# Patient Record
Sex: FEMALE | Race: WHITE | Employment: UNEMPLOYED | URBAN - METROPOLITAN AREA
[De-identification: names, ages, dates, MRNs, and addresses within clinical notes are randomized per-mention and may not be internally consistent; named-entity substitution may affect disease eponyms.]

---

## 2017-01-19 ENCOUNTER — APPOINTMENT (OUTPATIENT)
Dept: CT IMAGING | Age: 67
End: 2017-01-19
Payer: COMMERCIAL

## 2017-01-19 ENCOUNTER — APPOINTMENT (OUTPATIENT)
Dept: GENERAL RADIOLOGY | Age: 67
End: 2017-01-19
Payer: COMMERCIAL

## 2017-01-19 ENCOUNTER — HOSPITAL ENCOUNTER (EMERGENCY)
Age: 67
Discharge: HOME | End: 2017-01-19
Payer: COMMERCIAL

## 2017-01-19 VITALS
BODY MASS INDEX: 28.25 KG/M2 | WEIGHT: 180 LBS | DIASTOLIC BLOOD PRESSURE: 42 MMHG | RESPIRATION RATE: 20 BRPM | HEART RATE: 58 BPM | TEMPERATURE: 98.1 F | SYSTOLIC BLOOD PRESSURE: 163 MMHG | OXYGEN SATURATION: 97 % | HEIGHT: 67 IN

## 2017-01-19 DIAGNOSIS — S01.512A INTRAORAL LACERATION, INITIAL ENCOUNTER: ICD-10-CM

## 2017-01-19 DIAGNOSIS — S00.83XA FACIAL CONTUSION, INITIAL ENCOUNTER: Primary | ICD-10-CM

## 2017-01-19 DIAGNOSIS — S00.511A LIP ABRASION, INITIAL ENCOUNTER: ICD-10-CM

## 2017-01-19 DIAGNOSIS — S82.021A CLOSED DISPLACED LONGITUDINAL FRACTURE OF RIGHT PATELLA, INITIAL ENCOUNTER: ICD-10-CM

## 2017-01-19 PROCEDURE — 72125 CT NECK SPINE W/O DYE: CPT

## 2017-01-19 PROCEDURE — 73562 X-RAY EXAM OF KNEE 3: CPT

## 2017-01-19 PROCEDURE — 99284 EMERGENCY DEPT VISIT MOD MDM: CPT

## 2017-01-19 PROCEDURE — 70450 CT HEAD/BRAIN W/O DYE: CPT

## 2017-01-19 PROCEDURE — 70486 CT MAXILLOFACIAL W/O DYE: CPT

## 2017-01-19 PROCEDURE — 29505 APPLICATION LONG LEG SPLINT: CPT

## 2017-01-19 RX ORDER — M-VIT,TX,IRON,MINS/CALC/FOLIC 27MG-0.4MG
1 TABLET ORAL
COMMUNITY

## 2017-01-19 RX ORDER — LORAZEPAM 0.5 MG/1
0.5 TABLET ORAL DAILY
Status: ON HOLD | COMMUNITY
End: 2018-03-17 | Stop reason: HOSPADM

## 2017-01-19 RX ORDER — LEVOTHYROXINE SODIUM 0.03 MG/1
25 TABLET ORAL DAILY
COMMUNITY

## 2017-01-19 RX ORDER — CELECOXIB 200 MG/1
200 CAPSULE ORAL 2 TIMES DAILY
Status: ON HOLD | COMMUNITY
End: 2018-03-17 | Stop reason: HOSPADM

## 2017-01-19 RX ORDER — IBUPROFEN 800 MG/1
800 TABLET ORAL EVERY 6 HOURS PRN
Qty: 20 TABLET | Refills: 0 | Status: ON HOLD | OUTPATIENT
Start: 2017-01-19 | End: 2018-03-17 | Stop reason: HOSPADM

## 2017-01-19 RX ORDER — BUPROPION HYDROCHLORIDE 100 MG/1
100 TABLET ORAL DAILY
COMMUNITY

## 2017-01-19 RX ORDER — LISINOPRIL 20 MG/1
20 TABLET ORAL DAILY
COMMUNITY

## 2017-01-19 RX ORDER — CEPHALEXIN 500 MG/1
500 CAPSULE ORAL 4 TIMES DAILY
Qty: 40 CAPSULE | Refills: 0 | Status: SHIPPED | OUTPATIENT
Start: 2017-01-19

## 2017-01-19 RX ORDER — PRAVASTATIN SODIUM 10 MG
10 TABLET ORAL
Status: ON HOLD | COMMUNITY
End: 2018-03-17 | Stop reason: HOSPADM

## 2017-01-19 RX ORDER — HYDROCODONE BITARTRATE AND ACETAMINOPHEN 5; 325 MG/1; MG/1
1-2 TABLET ORAL EVERY 6 HOURS PRN
Qty: 14 TABLET | Refills: 0 | Status: SHIPPED | OUTPATIENT
Start: 2017-01-19 | End: 2017-01-26

## 2017-01-19 ASSESSMENT — PAIN DESCRIPTION - DESCRIPTORS: DESCRIPTORS: ACHING

## 2017-01-19 ASSESSMENT — PAIN DESCRIPTION - LOCATION: LOCATION: MOUTH

## 2017-01-19 ASSESSMENT — PAIN DESCRIPTION - PAIN TYPE: TYPE: ACUTE PAIN

## 2017-01-19 ASSESSMENT — PAIN SCALES - GENERAL: PAINLEVEL_OUTOF10: 3

## 2017-01-19 ASSESSMENT — ENCOUNTER SYMPTOMS
VOMITING: 0
COLOR CHANGE: 0
ABDOMINAL PAIN: 0
SHORTNESS OF BREATH: 0
BACK PAIN: 0
NAUSEA: 0
CHEST TIGHTNESS: 0

## 2017-01-19 ASSESSMENT — PAIN DESCRIPTION - FREQUENCY: FREQUENCY: CONTINUOUS

## 2017-01-19 ASSESSMENT — PAIN SCALES - WONG BAKER: WONGBAKER_NUMERICALRESPONSE: 2

## 2017-01-19 ASSESSMENT — PAIN DESCRIPTION - ORIENTATION: ORIENTATION: MID

## 2017-03-08 ENCOUNTER — HOSPITAL ENCOUNTER (OUTPATIENT)
Dept: PHYSICAL THERAPY | Age: 67
Setting detail: THERAPIES SERIES
Discharge: HOME | End: 2017-03-08
Payer: COMMERCIAL

## 2017-03-08 PROCEDURE — G8979 MOBILITY GOAL STATUS: HCPCS

## 2017-03-08 PROCEDURE — G8978 MOBILITY CURRENT STATUS: HCPCS

## 2017-03-08 PROCEDURE — 97162 PT EVAL MOD COMPLEX 30 MIN: CPT

## 2017-03-15 ENCOUNTER — HOSPITAL ENCOUNTER (OUTPATIENT)
Dept: PHYSICAL THERAPY | Age: 67
Setting detail: THERAPIES SERIES
Discharge: HOME | End: 2017-03-15
Payer: COMMERCIAL

## 2017-03-15 PROCEDURE — 97535 SELF CARE MNGMENT TRAINING: CPT

## 2017-03-15 PROCEDURE — 97110 THERAPEUTIC EXERCISES: CPT

## 2017-03-15 PROCEDURE — 97112 NEUROMUSCULAR REEDUCATION: CPT

## 2017-03-22 ENCOUNTER — HOSPITAL ENCOUNTER (OUTPATIENT)
Dept: PHYSICAL THERAPY | Age: 67
Setting detail: THERAPIES SERIES
Discharge: HOME | End: 2017-03-22
Payer: COMMERCIAL

## 2017-03-22 PROCEDURE — 97112 NEUROMUSCULAR REEDUCATION: CPT

## 2017-03-22 PROCEDURE — 97110 THERAPEUTIC EXERCISES: CPT

## 2017-03-22 PROCEDURE — 97116 GAIT TRAINING THERAPY: CPT

## 2017-03-22 ASSESSMENT — PAIN DESCRIPTION - LOCATION: LOCATION: KNEE

## 2017-03-22 ASSESSMENT — PAIN DESCRIPTION - DESCRIPTORS: DESCRIPTORS: DISCOMFORT

## 2017-03-22 ASSESSMENT — PAIN DESCRIPTION - ORIENTATION: ORIENTATION: RIGHT;LEFT

## 2017-03-22 ASSESSMENT — PAIN SCALES - GENERAL: PAINLEVEL_OUTOF10: 3

## 2017-03-29 ENCOUNTER — HOSPITAL ENCOUNTER (OUTPATIENT)
Dept: PHYSICAL THERAPY | Age: 67
Discharge: HOME | End: 2017-03-29

## 2017-04-05 ENCOUNTER — HOSPITAL ENCOUNTER (OUTPATIENT)
Dept: PHYSICAL THERAPY | Age: 67
Setting detail: THERAPIES SERIES
Discharge: HOME | End: 2017-04-05
Payer: COMMERCIAL

## 2017-04-05 PROCEDURE — 97112 NEUROMUSCULAR REEDUCATION: CPT

## 2017-04-05 PROCEDURE — 97116 GAIT TRAINING THERAPY: CPT

## 2017-04-05 PROCEDURE — 97110 THERAPEUTIC EXERCISES: CPT

## 2017-04-12 ENCOUNTER — HOSPITAL ENCOUNTER (OUTPATIENT)
Dept: PHYSICAL THERAPY | Age: 67
Setting detail: THERAPIES SERIES
Discharge: HOME | End: 2017-04-12
Payer: COMMERCIAL

## 2017-04-12 PROCEDURE — 97110 THERAPEUTIC EXERCISES: CPT

## 2017-04-12 PROCEDURE — 97116 GAIT TRAINING THERAPY: CPT

## 2017-04-12 PROCEDURE — 97112 NEUROMUSCULAR REEDUCATION: CPT

## 2017-04-26 ENCOUNTER — CLINICAL DOCUMENTATION (OUTPATIENT)
Dept: PHYSICAL THERAPY | Age: 67
End: 2017-04-26

## 2017-06-16 ENCOUNTER — CLINICAL DOCUMENTATION (OUTPATIENT)
Dept: PHYSICAL THERAPY | Age: 67
End: 2017-06-16

## 2018-03-16 ENCOUNTER — HOSPITAL ENCOUNTER (OUTPATIENT)
Age: 68
Setting detail: OBSERVATION
Discharge: HOME | End: 2018-03-17
Attending: STUDENT IN AN ORGANIZED HEALTH CARE EDUCATION/TRAINING PROGRAM | Admitting: INTERNAL MEDICINE
Payer: MEDICARE

## 2018-03-16 ENCOUNTER — APPOINTMENT (OUTPATIENT)
Dept: GENERAL RADIOLOGY | Age: 68
End: 2018-03-16
Payer: MEDICARE

## 2018-03-16 DIAGNOSIS — R52 INTRACTABLE PAIN: ICD-10-CM

## 2018-03-16 DIAGNOSIS — R53.1 GENERAL WEAKNESS: ICD-10-CM

## 2018-03-16 DIAGNOSIS — M54.32 SCIATICA OF LEFT SIDE: ICD-10-CM

## 2018-03-16 DIAGNOSIS — M25.552 LEFT HIP PAIN: Primary | ICD-10-CM

## 2018-03-16 LAB
ALBUMIN SERPL-MCNC: 4.2 G/DL (ref 3.9–4.9)
ALP BLD-CCNC: 144 U/L (ref 40–130)
ALT SERPL-CCNC: 33 U/L (ref 0–33)
AMPHETAMINE SCREEN, URINE: NORMAL
ANION GAP SERPL CALCULATED.3IONS-SCNC: 11 MEQ/L (ref 7–13)
APTT: 25 SEC (ref 21.6–35.4)
AST SERPL-CCNC: 46 U/L (ref 0–35)
BACTERIA: ABNORMAL /HPF
BARBITURATE SCREEN URINE: NORMAL
BASOPHILS ABSOLUTE: 0 K/UL (ref 0–0.2)
BASOPHILS RELATIVE PERCENT: 0.6 %
BENZODIAZEPINE SCREEN, URINE: NORMAL
BILIRUB SERPL-MCNC: 0.7 MG/DL (ref 0–1.2)
BILIRUBIN URINE: NEGATIVE
BLOOD, URINE: ABNORMAL
BUN BLDV-MCNC: 15 MG/DL (ref 8–23)
C-REACTIVE PROTEIN, HIGH SENSITIVITY: 1.2 MG/L (ref 0–5)
CALCIUM SERPL-MCNC: 9.7 MG/DL (ref 8.6–10.2)
CANNABINOID SCREEN URINE: NORMAL
CARBOXYHEMOGLOBIN: 5.6 % (ref 0–4)
CHLORIDE BLD-SCNC: 100 MEQ/L (ref 98–107)
CHOLESTEROL, TOTAL: 236 MG/DL (ref 0–199)
CK MB: 12.7 NG/ML (ref 0–3.8)
CLARITY: ABNORMAL
CO2: 26 MEQ/L (ref 22–29)
COCAINE METABOLITE SCREEN URINE: NORMAL
COLOR: YELLOW
CREAT SERPL-MCNC: 0.37 MG/DL (ref 0.5–0.9)
CREATINE KINASE-MB INDEX: 3.2 % (ref 0–3.5)
EKG ATRIAL RATE: 60 BPM
EKG P AXIS: 51 DEGREES
EKG P-R INTERVAL: 142 MS
EKG Q-T INTERVAL: 444 MS
EKG QRS DURATION: 80 MS
EKG QTC CALCULATION (BAZETT): 444 MS
EKG R AXIS: 21 DEGREES
EKG T AXIS: 57 DEGREES
EKG VENTRICULAR RATE: 60 BPM
EOSINOPHILS ABSOLUTE: 0 K/UL (ref 0–0.7)
EOSINOPHILS RELATIVE PERCENT: 0.1 %
EPITHELIAL CELLS, UA: ABNORMAL /HPF
GFR AFRICAN AMERICAN: >60
GFR NON-AFRICAN AMERICAN: >60
GLOBULIN: 1.9 G/DL (ref 2.3–3.5)
GLUCOSE BLD-MCNC: 96 MG/DL (ref 74–109)
GLUCOSE URINE: NEGATIVE MG/DL
HCT VFR BLD CALC: 38.3 % (ref 37–47)
HDLC SERPL-MCNC: 102 MG/DL (ref 40–59)
HEMOGLOBIN: 12.9 G/DL (ref 12–16)
INR BLD: 1
KETONES, URINE: 15 MG/DL
LACTIC ACID: 1.2 MMOL/L (ref 0.5–2.2)
LDL CHOLESTEROL CALCULATED: 121 MG/DL (ref 0–129)
LEUKOCYTE ESTERASE, URINE: ABNORMAL
LYMPHOCYTES ABSOLUTE: 0.7 K/UL (ref 1–4.8)
LYMPHOCYTES RELATIVE PERCENT: 12.7 %
Lab: NORMAL
MAGNESIUM: 1.9 MG/DL (ref 1.7–2.3)
MCH RBC QN AUTO: 31.7 PG (ref 27–31.3)
MCHC RBC AUTO-ENTMCNC: 33.8 % (ref 33–37)
MCV RBC AUTO: 93.8 FL (ref 82–100)
MONOCYTES ABSOLUTE: 0.3 K/UL (ref 0.2–0.8)
MONOCYTES RELATIVE PERCENT: 4.9 %
NEUTROPHILS ABSOLUTE: 4.5 K/UL (ref 1.4–6.5)
NEUTROPHILS RELATIVE PERCENT: 81.7 %
NITRITE, URINE: POSITIVE
OPIATE SCREEN URINE: NORMAL
PDW BLD-RTO: 13.4 % (ref 11.5–14.5)
PH UA: 6.5 (ref 5–9)
PHENCYCLIDINE SCREEN URINE: NORMAL
PLATELET # BLD: 186 K/UL (ref 130–400)
POTASSIUM SERPL-SCNC: 4.5 MEQ/L (ref 3.5–5.1)
PROTEIN UA: NEGATIVE MG/DL
PROTHROMBIN TIME: 10.3 SEC (ref 8.1–13.7)
RBC # BLD: 4.08 M/UL (ref 4.2–5.4)
RBC UA: ABNORMAL /HPF (ref 0–2)
SODIUM BLD-SCNC: 137 MEQ/L (ref 132–144)
SPECIFIC GRAVITY UA: 1.02 (ref 1–1.03)
TOTAL CK: 396 U/L (ref 0–170)
TOTAL PROTEIN: 6.1 G/DL (ref 6.4–8.1)
TRIGL SERPL-MCNC: 66 MG/DL (ref 0–200)
TROPONIN: <0.01 NG/ML (ref 0–0.01)
TSH SERPL DL<=0.05 MIU/L-ACNC: 2.17 UIU/ML (ref 0.27–4.2)
URINE REFLEX TO CULTURE: YES
UROBILINOGEN, URINE: 1 E.U./DL
WBC # BLD: 5.5 K/UL (ref 4.8–10.8)
WBC UA: ABNORMAL /HPF (ref 0–5)

## 2018-03-16 PROCEDURE — 87077 CULTURE AEROBIC IDENTIFY: CPT

## 2018-03-16 PROCEDURE — 2580000003 HC RX 258: Performed by: NURSE PRACTITIONER

## 2018-03-16 PROCEDURE — 73552 X-RAY EXAM OF FEMUR 2/>: CPT

## 2018-03-16 PROCEDURE — 81001 URINALYSIS AUTO W/SCOPE: CPT

## 2018-03-16 PROCEDURE — 6360000002 HC RX W HCPCS: Performed by: NURSE PRACTITIONER

## 2018-03-16 PROCEDURE — 84484 ASSAY OF TROPONIN QUANT: CPT

## 2018-03-16 PROCEDURE — 93005 ELECTROCARDIOGRAM TRACING: CPT

## 2018-03-16 PROCEDURE — 80307 DRUG TEST PRSMV CHEM ANLYZR: CPT

## 2018-03-16 PROCEDURE — 96372 THER/PROPH/DIAG INJ SC/IM: CPT

## 2018-03-16 PROCEDURE — 84443 ASSAY THYROID STIM HORMONE: CPT

## 2018-03-16 PROCEDURE — 87086 URINE CULTURE/COLONY COUNT: CPT

## 2018-03-16 PROCEDURE — 6370000000 HC RX 637 (ALT 250 FOR IP): Performed by: NURSE PRACTITIONER

## 2018-03-16 PROCEDURE — 86141 C-REACTIVE PROTEIN HS: CPT

## 2018-03-16 PROCEDURE — 85025 COMPLETE CBC W/AUTO DIFF WBC: CPT

## 2018-03-16 PROCEDURE — 85610 PROTHROMBIN TIME: CPT

## 2018-03-16 PROCEDURE — 85730 THROMBOPLASTIN TIME PARTIAL: CPT

## 2018-03-16 PROCEDURE — 82553 CREATINE MB FRACTION: CPT

## 2018-03-16 PROCEDURE — 80061 LIPID PANEL: CPT

## 2018-03-16 PROCEDURE — 96374 THER/PROPH/DIAG INJ IV PUSH: CPT

## 2018-03-16 PROCEDURE — G0378 HOSPITAL OBSERVATION PER HR: HCPCS

## 2018-03-16 PROCEDURE — 73502 X-RAY EXAM HIP UNI 2-3 VIEWS: CPT

## 2018-03-16 PROCEDURE — 80053 COMPREHEN METABOLIC PANEL: CPT

## 2018-03-16 PROCEDURE — 83605 ASSAY OF LACTIC ACID: CPT

## 2018-03-16 PROCEDURE — 87186 SC STD MICRODIL/AGAR DIL: CPT

## 2018-03-16 PROCEDURE — 82550 ASSAY OF CK (CPK): CPT

## 2018-03-16 PROCEDURE — 83735 ASSAY OF MAGNESIUM: CPT

## 2018-03-16 PROCEDURE — 87040 BLOOD CULTURE FOR BACTERIA: CPT

## 2018-03-16 PROCEDURE — 36415 COLL VENOUS BLD VENIPUNCTURE: CPT

## 2018-03-16 PROCEDURE — 99284 EMERGENCY DEPT VISIT MOD MDM: CPT

## 2018-03-16 PROCEDURE — 82375 ASSAY CARBOXYHB QUANT: CPT

## 2018-03-16 RX ORDER — ACETAMINOPHEN 325 MG/1
650 TABLET ORAL EVERY 4 HOURS PRN
Status: DISCONTINUED | OUTPATIENT
Start: 2018-03-16 | End: 2018-03-17 | Stop reason: HOSPADM

## 2018-03-16 RX ORDER — IBUPROFEN 600 MG/1
600 TABLET ORAL ONCE
Status: COMPLETED | OUTPATIENT
Start: 2018-03-16 | End: 2018-03-16

## 2018-03-16 RX ORDER — SODIUM CHLORIDE 9 MG/ML
INJECTION, SOLUTION INTRAVENOUS CONTINUOUS
Status: DISPENSED | OUTPATIENT
Start: 2018-03-16 | End: 2018-03-17

## 2018-03-16 RX ORDER — TRAMADOL HYDROCHLORIDE 50 MG/1
50 TABLET ORAL EVERY 6 HOURS PRN
Qty: 12 TABLET | Refills: 0 | Status: SHIPPED | OUTPATIENT
Start: 2018-03-16 | End: 2018-03-17 | Stop reason: HOSPADM

## 2018-03-16 RX ORDER — FENTANYL CITRATE 50 UG/ML
50 INJECTION, SOLUTION INTRAMUSCULAR; INTRAVENOUS ONCE
Status: COMPLETED | OUTPATIENT
Start: 2018-03-16 | End: 2018-03-16

## 2018-03-16 RX ORDER — KETOROLAC TROMETHAMINE 30 MG/ML
15 INJECTION, SOLUTION INTRAMUSCULAR; INTRAVENOUS ONCE
Status: DISCONTINUED | OUTPATIENT
Start: 2018-03-16 | End: 2018-03-16

## 2018-03-16 RX ORDER — MAGNESIUM OXIDE 400 MG/1
400 TABLET ORAL DAILY
COMMUNITY

## 2018-03-16 RX ORDER — FAMOTIDINE 20 MG/1
20 TABLET, FILM COATED ORAL 2 TIMES DAILY
Status: DISCONTINUED | OUTPATIENT
Start: 2018-03-16 | End: 2018-03-17 | Stop reason: HOSPADM

## 2018-03-16 RX ORDER — KETOROLAC TROMETHAMINE 15 MG/ML
15 INJECTION, SOLUTION INTRAMUSCULAR; INTRAVENOUS EVERY 6 HOURS PRN
Status: DISCONTINUED | OUTPATIENT
Start: 2018-03-16 | End: 2018-03-17 | Stop reason: HOSPADM

## 2018-03-16 RX ORDER — 0.9 % SODIUM CHLORIDE 0.9 %
1000 INTRAVENOUS SOLUTION INTRAVENOUS ONCE
Status: COMPLETED | OUTPATIENT
Start: 2018-03-16 | End: 2018-03-16

## 2018-03-16 RX ORDER — ETODOLAC 400 MG/1
400 TABLET, EXTENDED RELEASE ORAL DAILY
Qty: 30 TABLET | Refills: 3 | Status: SHIPPED | OUTPATIENT
Start: 2018-03-16 | End: 2018-03-17 | Stop reason: HOSPADM

## 2018-03-16 RX ORDER — ORPHENADRINE CITRATE 30 MG/ML
60 INJECTION INTRAMUSCULAR; INTRAVENOUS ONCE
Status: COMPLETED | OUTPATIENT
Start: 2018-03-16 | End: 2018-03-16

## 2018-03-16 RX ORDER — ONDANSETRON 2 MG/ML
4 INJECTION INTRAMUSCULAR; INTRAVENOUS EVERY 6 HOURS PRN
Status: DISCONTINUED | OUTPATIENT
Start: 2018-03-16 | End: 2018-03-17 | Stop reason: HOSPADM

## 2018-03-16 RX ORDER — TRAMADOL HYDROCHLORIDE 50 MG/1
50 TABLET ORAL ONCE
Status: COMPLETED | OUTPATIENT
Start: 2018-03-16 | End: 2018-03-16

## 2018-03-16 RX ORDER — SODIUM CHLORIDE 0.9 % (FLUSH) 0.9 %
10 SYRINGE (ML) INJECTION EVERY 12 HOURS SCHEDULED
Status: DISCONTINUED | OUTPATIENT
Start: 2018-03-16 | End: 2018-03-17 | Stop reason: HOSPADM

## 2018-03-16 RX ORDER — LIDOCAINE 50 MG/G
1 PATCH TOPICAL DAILY
Status: DISCONTINUED | OUTPATIENT
Start: 2018-03-17 | End: 2018-03-17 | Stop reason: HOSPADM

## 2018-03-16 RX ORDER — LANOLIN ALCOHOL/MO/W.PET/CERES
250 CREAM (GRAM) TOPICAL NIGHTLY
COMMUNITY

## 2018-03-16 RX ORDER — SODIUM CHLORIDE 0.9 % (FLUSH) 0.9 %
10 SYRINGE (ML) INJECTION PRN
Status: DISCONTINUED | OUTPATIENT
Start: 2018-03-16 | End: 2018-03-17 | Stop reason: HOSPADM

## 2018-03-16 RX ADMIN — ORPHENADRINE CITRATE 60 MG: 30 INJECTION INTRAMUSCULAR; INTRAVENOUS at 14:56

## 2018-03-16 RX ADMIN — SODIUM CHLORIDE 1000 ML: 9 INJECTION, SOLUTION INTRAVENOUS at 17:28

## 2018-03-16 RX ADMIN — IBUPROFEN 600 MG: 600 TABLET, FILM COATED ORAL at 14:58

## 2018-03-16 RX ADMIN — TRAMADOL HYDROCHLORIDE 50 MG: 50 TABLET, FILM COATED ORAL at 15:55

## 2018-03-16 RX ADMIN — FENTANYL CITRATE 50 MCG: 50 INJECTION, SOLUTION INTRAMUSCULAR; INTRAVENOUS at 18:22

## 2018-03-16 ASSESSMENT — ENCOUNTER SYMPTOMS
BACK PAIN: 0
VOMITING: 0
SHORTNESS OF BREATH: 0
COUGH: 0
COLOR CHANGE: 0
NAUSEA: 0
TROUBLE SWALLOWING: 0
DIARRHEA: 0
CONSTIPATION: 0
ABDOMINAL PAIN: 0
SORE THROAT: 0
VOICE CHANGE: 0

## 2018-03-16 ASSESSMENT — PAIN SCALES - GENERAL
PAINLEVEL_OUTOF10: 7
PAINLEVEL_OUTOF10: 10
PAINLEVEL_OUTOF10: 3
PAINLEVEL_OUTOF10: 7
PAINLEVEL_OUTOF10: 10

## 2018-03-16 ASSESSMENT — PAIN DESCRIPTION - LOCATION: LOCATION: HIP

## 2018-03-16 ASSESSMENT — PAIN DESCRIPTION - DESCRIPTORS: DESCRIPTORS: SHARP

## 2018-03-16 ASSESSMENT — PAIN DESCRIPTION - FREQUENCY: FREQUENCY: CONTINUOUS

## 2018-03-16 ASSESSMENT — PAIN DESCRIPTION - PAIN TYPE
TYPE: ACUTE PAIN
TYPE: ACUTE PAIN

## 2018-03-16 ASSESSMENT — PAIN DESCRIPTION - ORIENTATION: ORIENTATION: LEFT

## 2018-03-16 NOTE — ED PROVIDER NOTES
reviewed and negative. PAST MEDICAL HISTORY     Past Medical History:   Diagnosis Date    Arthritis     Depression     Headache     Hyperlipidemia     Hypertension     Thyroid disease        SURGICAL HISTORY       Past Surgical History:   Procedure Laterality Date    BACK SURGERY      CHOLECYSTECTOMY         CURRENT MEDICATIONS       Discharge Medication List as of 3/17/2018  6:37 PM      CONTINUE these medications which have NOT CHANGED    Details   magnesium oxide (MAG-OX) 400 MG tablet Take 400 mg by mouth dailyHistorical Med      niacin 250 MG extended release capsule Take 250 mg by mouth nightlyHistorical Med      levothyroxine (SYNTHROID) 25 MCG tablet Take 25 mcg by mouth daily      aspirin 81 MG tablet Take 81 mg by mouth 2 times daily      Multiple Vitamins-Minerals (THERAPEUTIC MULTIVITAMIN-MINERALS) tablet Take 1 tablet by mouth      lisinopril (PRINIVIL;ZESTRIL) 20 MG tablet Take 20 mg by mouth daily      buPROPion (WELLBUTRIN) 100 MG tablet Take 100 mg by mouth daily      cephALEXin (KEFLEX) 500 MG capsule Take 1 capsule by mouth 4 times daily, Disp-40 capsule, R-0             ALLERGIES     Indomethacin and Zomig [zolmitriptan]    FAMILY HISTORY     History reviewed. No pertinent family history.        SOCIAL HISTORY       Social History     Social History    Marital status:      Spouse name: N/A    Number of children: N/A    Years of education: N/A     Social History Main Topics    Smoking status: Never Smoker    Smokeless tobacco: Never Used    Alcohol use No    Drug use: No    Sexual activity: Not Asked     Other Topics Concern    None     Social History Narrative    None       SCREENINGS    [place] Coma Scale  Eye Opening: Spontaneous  Best Verbal Response: Oriented  Best Motor Response: Obeys commands  [place] Coma Scale Score: 15      PHYSICAL EXAM    (up to 7 for level 4, 8 or more for level 5)     ED Triage Vitals [03/16/18 1401]   BP Temp Temp Source Pulse Resp SpO2 components within normal limits   CKMB & RELATIVE PERCENT - Abnormal; Notable for the following:     CK-MB 12.7 (*)     All other components within normal limits   MICROSCOPIC URINALYSIS - Abnormal; Notable for the following:     WBC, UA 10-20 (*)     RBC, UA 5-10 (*)     All other components within normal limits   CBC WITH AUTO DIFFERENTIAL - Abnormal; Notable for the following:     WBC 4.0 (*)     RBC 3.69 (*)     Hemoglobin 11.6 (*)     Hematocrit 34.9 (*)     MCH 31.5 (*)     All other components within normal limits   BASIC METABOLIC PANEL W/ REFLEX TO MG FOR LOW K  - Abnormal; Notable for the following:     CREATININE 0.35 (*)     All other components within normal limits   HEPATIC FUNCTION PANEL - Abnormal; Notable for the following: Total Protein 5.1 (*)     Alb 3.5 (*)     All other components within normal limits   CULTURE BLOOD #1    Narrative:     ORDER#: [id number]                          ORDERED BY: LINDSEY LLAMAS  SOURCE: Blood                              COLLECTED:  03/16/18 17:05  ANTIBIOTICS AT JOHNY.:                      RECEIVED :  03/16/18 17:21   CULTURE BLOOD #2    Narrative:     ORDER#: [id number]                          ORDERED BY: Kerri Love  SOURCE: Blood                              COLLECTED:  03/16/18 17:05  ANTIBIOTICS AT JOHNY.:                      RECEIVED :  03/16/18 17:21   LACTIC ACID, PLASMA   APTT   PROTIME-INR   TROPONIN   URINE DRUG SCREEN   TSH WITHOUT REFLEX   HIGH SENSITIVITY CRP   MAGNESIUM       All other labs were within normal range or not returned as of this dictation.     EMERGENCY DEPARTMENT COURSE and DIFFERENTIAL DIAGNOSIS/MDM:   Vitals:    Vitals:    03/16/18 1934 03/16/18 2155 03/17/18 0054 03/17/18 0838   BP: (!) 158/88 (!) 128/48 110/61 132/61   Pulse: 66 57 69    Resp: 14 18 16    Temp:  98.1 °F (36.7 °C) 98.2 °F (36.8 °C)    TempSrc:  Oral Oral    SpO2: 98% 97% 97%    Weight:  176 lb 9.4 oz (80.1 kg)     Height:  5' 7\" (1.702 m)         MDM  The patient was seen and evaluated by the attending physician as well as myself. Patient presents to the emergency room with complaints of left hip pain that radiates down the back of her leg to her left knee that started this morning with no known injury. I ordered x-rays to rule out acute fracture or dislocation. Her x-rays are unremarkable. I gave the patient Norflex and Motrin while in the emergency room with minimal pain relief. Patient is nontoxic and vital signs are stable. I did order a urine for rule out of urinary tract infection however the patient states that she is unable to PE at this time. The patient will be discharged home with a prescription for Ultram and Lodine. I will also provide her with a referral for orthopedics for further evaluation. I provided her with anticipatory guidance and have instructed her on when to return to the emergency room and what timeframe. Patient verbalizes understanding and has no further questions. The primary nurse went to discharge the patient and place her into the wheelchair she states that she is unable to move from a standing position into the wheelchair. I assessed the patient again and she states that she has been feeling weak and hasn't been able to PEe a lot that she feels dehydrated. The patient will get a sepsis workup to rule out any leukocytosis or electrolyte abnormalities. I provided her with IV fluids while in the ER. The patient's labs are unremarkable. The patient has been given multiple medications for her pain with no pain relief. The patient will be admitted to the hospital for intractable pain. I have discussed the patient with Dr. Shari Esteban who has accepted the patient. I have discussed the admission with the patient who has no further questions. CRITICAL CARE TIME     Total Critical Care time (not applicable if blank)    Total minutes, excluding separately reportable procedures.  There was a high probability of clinically significant/life threatening deterioration in the patient's condition which required my urgent intervention. This includes discussing the case with consultants, reviewing laboratory studies and images independently, arranging disposition, and speaking with patient/family    CONSULTS:  IP CONSULT TO HOSPITALIST  IP CONSULT TO ORTHOPEDIC SURGERY    PROCEDURES:  Unless otherwise noted below, none     Procedures    FINAL IMPRESSION      1. Left hip pain    2. Sciatica of left side    3. Intractable pain    4. General weakness          DISPOSITION/PLAN   DISPOSITION Admitted 03/16/2018 07:11:45 PM      PATIENT REFERRED TO:  Peyman Abbott [address]  [address] [facility]  [id number]      As needed    Elizabeth Matute MD  [address]  [facility] [phone]    In 1 week  For left gluteal pain/ hip pain      DISCHARGE MEDICATIONS:  Discharge Medication List as of 3/17/2018  6:37 PM      START taking these medications    Details   oxyCODONE-acetaminophen (PERCOCET) 5-325 MG per tablet Take 1 tablet by mouth every 4 hours as needed for Pain for up to 30 doses. , Disp-30 tablet, R-0Print      lidocaine (LIDODERM) 5 % Place 1 patch onto the skin daily 12 hours on, 12 hours off., Disp-30 patch, R-0Print      !! methylPREDNISolone (MEDROL) 4 MG tablet Take 1 tablet by mouth every morning (before breakfast) for 6 days, Disp-6 tablet, R-0Normal      !! methylPREDNISolone (MEDROL) 4 MG tablet Take 1 tablet by mouth Daily with lunch for 6 days, Disp-6 tablet, R-0Normal      !! methylPREDNISolone (MEDROL) 4 MG tablet Take 1 tablet by mouth Daily with supper for 6 days, Disp-6 tablet, R-0Normal      !! methylPREDNISolone (MEDROL) 4 MG tablet Take 1 tablet by mouth nightly for 6 days, Disp-6 tablet, R-0Normal      !! methylPREDNISolone (MEDROL) 8 MG tablet Take 1 tablet by mouth nightly for 6 days, Disp-6 tablet, R-0Normal       !! - Potential duplicate medications found. Please discuss with provider.              (Please note that

## 2018-03-16 NOTE — ED NOTES
Hospitalist down to see patient. Patient informed that she is being admitted. Patient states that she does not need anything at this time. Call light is within reach of the patient. Patient is on the phone at this time speaking to the family.      Angy Hay RN  03/16/18 0823

## 2018-03-16 NOTE — ED TRIAGE NOTES
Pt c/o left hip pain that started this AM, denies trauma, Pt is A&OX3, tearful, breathes are equal and unlabored, states she is unable to bear weight on LLE.

## 2018-03-16 NOTE — H&P
Hospital Medicine History & Physical      PCP: Keely Beltre    Date of Admission: 3/16/2018    Date of Service: 3/16/18      Chief Complaint:  Left hip pain      History Of Present Illness:  78 y.o. female who presented to [facility] ED for complaints of left hip pain which started this morning. Denies trauma. She has a known history of depression, htn, and hld. She startes the the pain starts in the left hip pain which radiates down his leg to her knee. It started as soon as she got out of bed. She states that she is unable to bear weight. Denies cp sob ha rash anx n v d f    Past Medical History:          Diagnosis Date    Arthritis     Depression     Headache     Hyperlipidemia     Hypertension     Thyroid disease        Past Surgical History:          Procedure Laterality Date    BACK SURGERY      CHOLECYSTECTOMY         Medications Prior to Admission:      Prior to Admission medications    Medication Sig Start Date End Date Taking? Authorizing Provider   traMADol (ULTRAM) 50 MG tablet Take 1 tablet by mouth every 6 hours as needed for Pain for up to 3 days . Take lowest dose possible to manage pain.  3/16/18 3/19/18 Yes Didi Gee NP   etodolac (LODINE XL) 400 MG extended release tablet Take 1 tablet by mouth daily 3/16/18  Yes Didi Gee NP   celecoxib (CELEBREX) 200 MG capsule Take 200 mg by mouth 2 times daily    Historical Provider, MD   lisinopril (PRINIVIL;ZESTRIL) 20 MG tablet Take 20 mg by mouth daily    Historical Provider, MD   levothyroxine (SYNTHROID) 25 MCG tablet Take 25 mcg by mouth daily    Historical Provider, MD   pravastatin (PRAVACHOL) 10 MG tablet Take 10 mg by mouth    Historical Provider, MD   buPROPion (WELLBUTRIN) 100 MG tablet Take 100 mg by mouth daily    Historical Provider, MD   aspirin 81 MG tablet Take 81 mg by mouth 2 times daily    Historical Provider, MD   LORazepam (ATIVAN) 0.5 MG tablet Take 0.5 mg by mouth daily    Historical

## 2018-03-17 ENCOUNTER — APPOINTMENT (OUTPATIENT)
Dept: MRI IMAGING | Age: 68
End: 2018-03-17
Payer: MEDICARE

## 2018-03-17 VITALS
OXYGEN SATURATION: 97 % | WEIGHT: 176.59 LBS | DIASTOLIC BLOOD PRESSURE: 61 MMHG | TEMPERATURE: 98.2 F | HEIGHT: 67 IN | SYSTOLIC BLOOD PRESSURE: 132 MMHG | HEART RATE: 69 BPM | BODY MASS INDEX: 27.72 KG/M2 | RESPIRATION RATE: 16 BRPM

## 2018-03-17 LAB
ALBUMIN SERPL-MCNC: 3.5 G/DL (ref 3.9–4.9)
ALP BLD-CCNC: 117 U/L (ref 40–130)
ALT SERPL-CCNC: 27 U/L (ref 0–33)
ANION GAP SERPL CALCULATED.3IONS-SCNC: 7 MEQ/L (ref 7–13)
AST SERPL-CCNC: 35 U/L (ref 0–35)
BASOPHILS ABSOLUTE: 0 K/UL (ref 0–0.2)
BASOPHILS RELATIVE PERCENT: 0.7 %
BILIRUB SERPL-MCNC: 0.7 MG/DL (ref 0–1.2)
BILIRUBIN DIRECT: 0.2 MG/DL (ref 0–0.3)
BILIRUBIN, INDIRECT: 0.5 MG/DL (ref 0–0.6)
BUN BLDV-MCNC: 12 MG/DL (ref 8–23)
CALCIUM SERPL-MCNC: 9.1 MG/DL (ref 8.6–10.2)
CHLORIDE BLD-SCNC: 105 MEQ/L (ref 98–107)
CO2: 27 MEQ/L (ref 22–29)
CREAT SERPL-MCNC: 0.35 MG/DL (ref 0.5–0.9)
EOSINOPHILS ABSOLUTE: 0 K/UL (ref 0–0.7)
EOSINOPHILS RELATIVE PERCENT: 0.9 %
GFR AFRICAN AMERICAN: >60
GFR NON-AFRICAN AMERICAN: >60
GLUCOSE BLD-MCNC: 95 MG/DL (ref 74–109)
HCT VFR BLD CALC: 34.9 % (ref 37–47)
HEMOGLOBIN: 11.6 G/DL (ref 12–16)
LYMPHOCYTES ABSOLUTE: 1 K/UL (ref 1–4.8)
LYMPHOCYTES RELATIVE PERCENT: 25.6 %
MCH RBC QN AUTO: 31.5 PG (ref 27–31.3)
MCHC RBC AUTO-ENTMCNC: 33.3 % (ref 33–37)
MCV RBC AUTO: 94.7 FL (ref 82–100)
MONOCYTES ABSOLUTE: 0.4 K/UL (ref 0.2–0.8)
MONOCYTES RELATIVE PERCENT: 11.1 %
NEUTROPHILS ABSOLUTE: 2.5 K/UL (ref 1.4–6.5)
NEUTROPHILS RELATIVE PERCENT: 61.7 %
PDW BLD-RTO: 13.6 % (ref 11.5–14.5)
PLATELET # BLD: 151 K/UL (ref 130–400)
POTASSIUM REFLEX MAGNESIUM: 4 MEQ/L (ref 3.5–5.1)
RBC # BLD: 3.69 M/UL (ref 4.2–5.4)
SODIUM BLD-SCNC: 139 MEQ/L (ref 132–144)
TOTAL PROTEIN: 5.1 G/DL (ref 6.4–8.1)
WBC # BLD: 4 K/UL (ref 4.8–10.8)

## 2018-03-17 PROCEDURE — G0378 HOSPITAL OBSERVATION PER HR: HCPCS

## 2018-03-17 PROCEDURE — 6370000000 HC RX 637 (ALT 250 FOR IP): Performed by: INTERNAL MEDICINE

## 2018-03-17 PROCEDURE — 73721 MRI JNT OF LWR EXTRE W/O DYE: CPT

## 2018-03-17 PROCEDURE — 6370000000 HC RX 637 (ALT 250 FOR IP): Performed by: PHYSICIAN ASSISTANT

## 2018-03-17 PROCEDURE — 6370000000 HC RX 637 (ALT 250 FOR IP): Performed by: ORTHOPAEDIC SURGERY

## 2018-03-17 PROCEDURE — 80076 HEPATIC FUNCTION PANEL: CPT

## 2018-03-17 PROCEDURE — 96376 TX/PRO/DX INJ SAME DRUG ADON: CPT

## 2018-03-17 PROCEDURE — 85025 COMPLETE CBC W/AUTO DIFF WBC: CPT

## 2018-03-17 PROCEDURE — 6360000002 HC RX W HCPCS: Performed by: ORTHOPAEDIC SURGERY

## 2018-03-17 PROCEDURE — 96372 THER/PROPH/DIAG INJ SC/IM: CPT

## 2018-03-17 PROCEDURE — 6360000002 HC RX W HCPCS: Performed by: PHYSICIAN ASSISTANT

## 2018-03-17 PROCEDURE — 96375 TX/PRO/DX INJ NEW DRUG ADDON: CPT

## 2018-03-17 PROCEDURE — 36415 COLL VENOUS BLD VENIPUNCTURE: CPT

## 2018-03-17 PROCEDURE — 2580000003 HC RX 258: Performed by: PHYSICIAN ASSISTANT

## 2018-03-17 PROCEDURE — 80048 BASIC METABOLIC PNL TOTAL CA: CPT

## 2018-03-17 RX ORDER — LISINOPRIL 20 MG/1
20 TABLET ORAL DAILY
Status: DISCONTINUED | OUTPATIENT
Start: 2018-03-17 | End: 2018-03-17 | Stop reason: HOSPADM

## 2018-03-17 RX ORDER — OXYCODONE HYDROCHLORIDE AND ACETAMINOPHEN 5; 325 MG/1; MG/1
1 TABLET ORAL EVERY 4 HOURS PRN
Qty: 30 TABLET | Refills: 0 | Status: SHIPPED | OUTPATIENT
Start: 2018-03-17 | End: 2018-03-22

## 2018-03-17 RX ORDER — LIDOCAINE 50 MG/G
1 PATCH TOPICAL DAILY
Qty: 30 PATCH | Refills: 0 | Status: SHIPPED | OUTPATIENT
Start: 2018-03-18

## 2018-03-17 RX ORDER — METHYLPREDNISOLONE 4 MG/1
4 TABLET ORAL NIGHTLY
Status: DISCONTINUED | OUTPATIENT
Start: 2018-03-19 | End: 2018-03-17 | Stop reason: HOSPADM

## 2018-03-17 RX ORDER — OXYCODONE HYDROCHLORIDE AND ACETAMINOPHEN 5; 325 MG/1; MG/1
1 TABLET ORAL EVERY 4 HOURS PRN
Status: DISCONTINUED | OUTPATIENT
Start: 2018-03-17 | End: 2018-03-17 | Stop reason: HOSPADM

## 2018-03-17 RX ORDER — METHYLPREDNISOLONE 4 MG/1
4 TABLET ORAL
Status: DISCONTINUED | OUTPATIENT
Start: 2018-03-18 | End: 2018-03-17 | Stop reason: HOSPADM

## 2018-03-17 RX ORDER — LEVOTHYROXINE SODIUM 0.03 MG/1
25 TABLET ORAL DAILY
Status: DISCONTINUED | OUTPATIENT
Start: 2018-03-17 | End: 2018-03-17 | Stop reason: HOSPADM

## 2018-03-17 RX ORDER — METHYLPREDNISOLONE 4 MG/1
4 TABLET ORAL
Qty: 6 TABLET | Refills: 0 | Status: SHIPPED | OUTPATIENT
Start: 2018-03-18 | End: 2018-03-24

## 2018-03-17 RX ORDER — DIAZEPAM 5 MG/1
5 TABLET ORAL EVERY 6 HOURS PRN
Status: DISCONTINUED | OUTPATIENT
Start: 2018-03-17 | End: 2018-03-17 | Stop reason: HOSPADM

## 2018-03-17 RX ORDER — METHYLPREDNISOLONE 4 MG/1
24 TABLET ORAL ONCE
Status: COMPLETED | OUTPATIENT
Start: 2018-03-17 | End: 2018-03-17

## 2018-03-17 RX ORDER — METHYLPREDNISOLONE 8 MG/1
8 TABLET ORAL NIGHTLY
Qty: 6 TABLET | Refills: 0 | Status: SHIPPED | OUTPATIENT
Start: 2018-03-18 | End: 2018-03-24

## 2018-03-17 RX ORDER — METHYLPREDNISOLONE 4 MG/1
8 TABLET ORAL NIGHTLY
Status: DISCONTINUED | OUTPATIENT
Start: 2018-03-18 | End: 2018-03-17 | Stop reason: HOSPADM

## 2018-03-17 RX ORDER — PRAVASTATIN SODIUM 10 MG
10 TABLET ORAL NIGHTLY
Status: DISCONTINUED | OUTPATIENT
Start: 2018-03-17 | End: 2018-03-17 | Stop reason: HOSPADM

## 2018-03-17 RX ORDER — ASPIRIN 81 MG/1
81 TABLET, CHEWABLE ORAL 2 TIMES DAILY
Status: DISCONTINUED | OUTPATIENT
Start: 2018-03-17 | End: 2018-03-17 | Stop reason: HOSPADM

## 2018-03-17 RX ORDER — METHYLPREDNISOLONE 4 MG/1
4 TABLET ORAL NIGHTLY
Qty: 6 TABLET | Refills: 0 | Status: SHIPPED | OUTPATIENT
Start: 2018-03-19 | End: 2018-03-25

## 2018-03-17 RX ADMIN — ACETAMINOPHEN 650 MG: 325 TABLET ORAL at 03:05

## 2018-03-17 RX ADMIN — KETOROLAC TROMETHAMINE 15 MG: 15 INJECTION, SOLUTION INTRAMUSCULAR; INTRAVENOUS at 03:06

## 2018-03-17 RX ADMIN — KETOROLAC TROMETHAMINE 15 MG: 15 INJECTION, SOLUTION INTRAMUSCULAR; INTRAVENOUS at 08:52

## 2018-03-17 RX ADMIN — SODIUM CHLORIDE: 9 INJECTION, SOLUTION INTRAVENOUS at 08:22

## 2018-03-17 RX ADMIN — ASPIRIN 81 MG 81 MG: 81 TABLET ORAL at 08:40

## 2018-03-17 RX ADMIN — LISINOPRIL 20 MG: 20 TABLET ORAL at 08:39

## 2018-03-17 RX ADMIN — LEVOTHYROXINE SODIUM 25 MCG: 25 TABLET ORAL at 07:28

## 2018-03-17 RX ADMIN — OXYCODONE HYDROCHLORIDE AND ACETAMINOPHEN 1 TABLET: 5; 325 TABLET ORAL at 17:10

## 2018-03-17 RX ADMIN — FAMOTIDINE 20 MG: 20 TABLET, FILM COATED ORAL at 03:05

## 2018-03-17 RX ADMIN — FAMOTIDINE 20 MG: 20 TABLET, FILM COATED ORAL at 08:39

## 2018-03-17 RX ADMIN — ENOXAPARIN SODIUM 40 MG: 40 INJECTION SUBCUTANEOUS at 08:38

## 2018-03-17 RX ADMIN — OXYCODONE HYDROCHLORIDE AND ACETAMINOPHEN 1 TABLET: 5; 325 TABLET ORAL at 13:13

## 2018-03-17 RX ADMIN — METHYLPREDNISOLONE 24 MG: 4 TABLET ORAL at 13:14

## 2018-03-17 RX ADMIN — Medication 10 ML: at 03:19

## 2018-03-17 ASSESSMENT — PAIN SCALES - GENERAL
PAINLEVEL_OUTOF10: 7
PAINLEVEL_OUTOF10: 5
PAINLEVEL_OUTOF10: 7
PAINLEVEL_OUTOF10: 5
PAINLEVEL_OUTOF10: 5
PAINLEVEL_OUTOF10: 3

## 2018-03-17 NOTE — DISCHARGE SUMMARY
Physician Discharge Summary     Patient ID:  Antony Contreras  [id number]  13 y.o.  1950    Admit date: 3/16/2018    Discharge date and time: 3/17/2018      Admitting Physician: Zak Goff MD     Discharge Physician: Carrie Gu      Admission Diagnoses: Left hip pain [M25.552]    Discharge Diagnoses: L hip gluteal strain w spasm    Admission Condition: fair    Discharged Condition: good    Indication for Admission: Hip Pain    Hospital Course: Improvement with steroids    Consults: orthopedic surgery    Significant Diagnostic Studies: MRI No Fracture    Treatments: steroids: solu-medrol   Patient was offered continued stay for pain control and requested early discharge.     Discharge Exam:  /61   Pulse 69   Temp 98.2 °F (36.8 °C) (Oral)   Resp 16   Ht 5' 7\" (1.702 m)   Wt 176 lb 9.4 oz (80.1 kg)   SpO2 97%   BMI 27.66 kg/m²     General Appearance:    Alert, cooperative, no distress, appears stated age   Head:    Normocephalic, without obvious abnormality, atraumatic   Eyes:    PERRL, conjunctiva/corneas clear, EOM's intact, fundi     benign, both eyes   Ears:    Normal TM's and external ear canals, both ears   Nose:   Nares normal, septum midline, mucosa normal, no drainage    or sinus tenderness   Throat:   Lips, mucosa, and tongue normal; teeth and gums normal   Neck:   Supple, symmetrical, trachea midline, no adenopathy;     thyroid:  no enlargement/tenderness/nodules; no carotid    bruit or JVD   Back:     Symmetric, no curvature, ROM normal, no CVA tenderness   Lungs:     Clear to auscultation bilaterally, respirations unlabored   Chest Wall:    No tenderness or deformity    Heart:    Regular rate and rhythm, S1 and S2 normal, no murmur, rub   or gallop   Breast Exam:    No tenderness, masses, or nipple abnormality   Abdomen:     Soft, non-tender, bowel sounds active all four quadrants,     no masses, no organomegaly   Genitalia:    Normal female without lesion, discharge or tenderness mouth daily      niacin 250 MG extended release capsule Take 250 mg by mouth nightly      levothyroxine (SYNTHROID) 25 MCG tablet Take 25 mcg by mouth daily      aspirin 81 MG tablet Take 81 mg by mouth 2 times daily      Multiple Vitamins-Minerals (THERAPEUTIC MULTIVITAMIN-MINERALS) tablet Take 1 tablet by mouth      lisinopril (PRINIVIL;ZESTRIL) 20 MG tablet Take 20 mg by mouth daily      buPROPion (WELLBUTRIN) 100 MG tablet Take 100 mg by mouth daily      cephALEXin (KEFLEX) 500 MG capsule Take 1 capsule by mouth 4 times daily  Qty: 40 capsule, Refills: 0         STOP taking these medications       celecoxib (CELEBREX) 200 MG capsule Comments:   Reason for Stopping:         pravastatin (PRAVACHOL) 10 MG tablet Comments:   Reason for Stopping:         LORazepam (ATIVAN) 0.5 MG tablet Comments:   Reason for Stopping:         ibuprofen (ADVIL;MOTRIN) 800 MG tablet Comments:   Reason for Stopping:             Activity: activity as tolerated  Diet: regular diet  Wound Care: none needed    Follow-up with Autumn Boo  in 1 week.     Signed:  Tracie Ashford    3/17/2018  5:17 PM

## 2018-03-18 NOTE — CARE COORDINATION
Received info from night supervisor Deni Saldivar that pt had difficulty filing scripts at her pharmacy [facility] in [place] ( [phone]). Not knowing when pt would be available for a call, I opted to call her between 1000 and 1030. In the mean time, pt called Karen Cabrera and I overheard his conversation and asked him to tell pt I would call. I did call pt @ 1029  Pt was angry that the hospital \"didn't know it( Lidoderm patch) had to be authorized\". 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 I first apologized for the unconvience and then made mention that there is no way a hospital can know what a vast number of insurance companies may or may not need a pre-authorization of medications for unless it is a high risk cardiac med. She still insisted that \" you should know\". I then said when she had a pt walk into her dr office did she automatically know what their insurance would pay, and she replied \"no\" and seemed to be appeased by this. I then told her I would call her pharmacy, get the number to call and try to get the med approved, but they may not be available over the weekend. She stated she had to pay out of pocket for 2 patches and feels she should be reimbursed for this ( I asked the pharmacist the cost and he told me it was around $12). She also told me that the Percocet was \"upsetting my stomach\" and she \"told them I can't take tylenol\" even though this is not listed as an allergy and she also took the Percocet here and tolerated it. When I mentioned this she said she \"was too doped up\" and \"probably didn't feel it\". I told her I would call Dr Harvey Baum and make him aware of this but do not know if another medication could/would be ordered. She wanted a Percocet \" with ibuprofen and not tylenol\". I called and spoke w Joanne Mcgovern at [facility] and he was the pharmacist who dealt w this issue yesterday.  He said there was a problem w the way the prednisone Escript

## 2018-03-19 LAB
ORGANISM: ABNORMAL
URINE CULTURE, ROUTINE: ABNORMAL
URINE CULTURE, ROUTINE: ABNORMAL

## 2018-03-19 NOTE — CONSULTS
Inpatient Consultation      Mane Erickson (1950)  3/17/2018    Reason for Consult:  L hip gluteal strain w spasm    IMPRESSION/RECOMMENDATIONS:    Assessment:  L hip gluteal strain w spasm    Plan:  1) MRI L hip   2) MDP, valium, percocet  3) PT eval -   Plan to dc home if MRI neg for Stress Fracture    Amita Chol    CHIEF COMPLAINT:   L hip gluteal strain w spasm      HISTORY OF PRESENT ILLNESS:                The patient is a 78 y.o. female who presents with above chief complaint.    L hip gluteal strain w spasm      Past Medical History:        Diagnosis Date    Arthritis     Depression     Headache     Hyperlipidemia     Hypertension     Thyroid disease      Past Surgical History:        Procedure Laterality Date    BACK SURGERY      CHOLECYSTECTOMY       Current Medications:   Current Facility-Administered Medications: lisinopril (PRINIVIL;ZESTRIL) tablet 20 mg, 20 mg, Oral, Daily  pravastatin (PRAVACHOL) tablet 10 mg, 10 mg, Oral, Nightly  aspirin chewable tablet 81 mg, 81 mg, Oral, BID  levothyroxine (SYNTHROID) tablet 25 mcg, 25 mcg, Oral, Daily  methylPREDNISolone (MEDROL) tablet 24 mg, 24 mg, Oral, Once  [START ON 3/18/2018] methylPREDNISolone (MEDROL) tablet 4 mg, 4 mg, Oral, QAM AC  [START ON 3/18/2018] methylPREDNISolone (MEDROL) tablet 4 mg, 4 mg, Oral, Lunch  [START ON 3/18/2018] methylPREDNISolone (MEDROL) tablet 4 mg, 4 mg, Oral, Dinner  [START ON 3/18/2018] methylPREDNISolone (MEDROL) tablet 8 mg, 8 mg, Oral, Nightly  [START ON 3/19/2018] methylPREDNISolone (MEDROL) tablet 4 mg, 4 mg, Oral, Nightly  diazepam (VALIUM) tablet 5 mg, 5 mg, Oral, Q6H PRN  oxyCODONE-acetaminophen (PERCOCET) 5-325 MG per tablet 1 tablet, 1 tablet, Oral, Q4H PRN  sodium chloride flush 0.9 % injection 10 mL, 10 mL, Intravenous, 2 times per day  sodium chloride flush 0.9 % injection 10 mL, 10 mL, Intravenous, PRN  acetaminophen (TYLENOL) tablet 650 mg, 650 mg, Oral, Q4H PRN  magnesium hydroxide (MILK OF
neurovascularly intact. There is  a palpable pedal pulse. Warm and well perfused. Skin intact. Sensation  intact to light touch. She has a 5/5 neurologic exam for hip flexor, quad,  hamstring, ankle dorsi, ankle plantarflexion strength. She has full  passive motion of the hip. She has a little bit of pain with impingement. She has some minor pain directly over the greater trochanter. She has 5/5  hip abductor strength but with pain. She also has pain in the gluteus  medius and minimus diffusely. She has slightly swollen right hip. She has  full motion. Normal strength. No instability in comparison. Bilateral  upper extremities have full passive motion. No pain. No deformity. DIAGNOSTIC DATA:  X-rays of the left hip show no acute fractures or  dislocations. No arthritic change. We will await definitive diagnosis for MRI and then update the plan of  care. Please see electronic medical records for full documented past medical  history, surgical history, medications, allergies, social history, family  history, review of systems, updated laboratory results, and vital signs. I  agree with the findings in electronic medical records as documented.       Jen Maddox MD    D: 03/18/2018 16:28:39       T: 03/18/2018 21:21:14     [email or web address]  Job#: [id number]     Doc#: [id number]    CC:

## 2018-03-20 ASSESSMENT — ENCOUNTER SYMPTOMS
BACK PAIN: 0
COLOR CHANGE: 0
NAUSEA: 0
SORE THROAT: 0
VOICE CHANGE: 0
CONSTIPATION: 0
ABDOMINAL PAIN: 0
TROUBLE SWALLOWING: 0
COUGH: 0
VOMITING: 0
DIARRHEA: 0
SHORTNESS OF BREATH: 0

## 2018-03-21 LAB
BLOOD CULTURE, ROUTINE: NORMAL
CULTURE, BLOOD 2: NORMAL

## 2018-03-26 ENCOUNTER — HOSPITAL ENCOUNTER (OUTPATIENT)
Dept: PHYSICAL THERAPY | Age: 68
Setting detail: THERAPIES SERIES
Discharge: HOME | End: 2018-03-26
Payer: MEDICARE

## 2018-03-26 PROCEDURE — G8979 MOBILITY GOAL STATUS: HCPCS

## 2018-03-26 PROCEDURE — 97162 PT EVAL MOD COMPLEX 30 MIN: CPT

## 2018-03-26 PROCEDURE — G8978 MOBILITY CURRENT STATUS: HCPCS

## 2018-03-26 ASSESSMENT — PAIN DESCRIPTION - ORIENTATION: ORIENTATION: LEFT

## 2018-03-26 ASSESSMENT — PAIN DESCRIPTION - PAIN TYPE: TYPE: ACUTE PAIN

## 2018-03-26 ASSESSMENT — PAIN SCALES - GENERAL: PAINLEVEL_OUTOF10: 4

## 2018-03-26 ASSESSMENT — PAIN DESCRIPTION - DESCRIPTORS: DESCRIPTORS: DULL;ACHING

## 2018-03-26 ASSESSMENT — PAIN DESCRIPTION - LOCATION: LOCATION: HIP

## 2018-03-26 NOTE — PROGRESS NOTES
reports increased pain with working-out. Decreased pain since taking a break from exercise classes. Denies any sudden loss of bowel or bladder control. Patient reports getting cortisone injection last week that did help left hip pain. Objective:   Sensation  Overall Sensation Status: WNL    Ambulation 1  Surface: carpet  Device: No Device  Assistance: Modified Independent  Quality of Gait: narrow base of support, decreased stance time on left LE, antalgic gait pattern  Distance: clinical distance in department     Transfers  Sit to Stand: Modified independent  Stand to sit: Modified independent  Comment: uses UEs    Strength RLE  Strength RLE: Exception  R Hip Flexion: 4-/5  R Hip Extension: 4/5  R Hip ABduction: 4-/5  R Hip ADduction: 4/5  R Hip Internal Rotation: 4-/5  R Hip External Rotation: 4-/5  R Knee Flexion: 4/5  R Knee Extension: 5/5  R Ankle Dorsiflexion: 4+/5  Strength LLE  Strength LLE: Exception  L Hip Flexion: 4-/5  L Hip Extension: 4/5  L Hip ABduction: 4-/5  L Hip ADduction: 4/5  L Hip Internal Rotation: 4-/5  L Hip External Rotation: 4-/5  L Knee Flexion: 4/5  L Knee Extension: 5/5  L Ankle Dorsiflexion: 4+/5        Strength Other  Other: Decreased core strength based off functional mobility.     PROM RLE (degrees)  RLE General PROM: SLR: 90 deg  AROM RLE (degrees)  RLE General AROM: R hip ROM WFL  PROM LLE (degrees)  LLE General PROM: SLR: 90 deg  AROM LLE (degrees)  LLE General AROM: L hip ROM WFL      Spine  Lumbar: flex 90 deg, ext 25 deg, bilateral SB and rotation WFL    Observation/Palpation  Posture: Good  Palpation: left ASIS higher than right, left PSIS higher than right; increased tenderness around SI joint Left side compared to right  Observation: left leg shorter than right leg  Bed mobility  Supine to Sit: Independent  Sit to Supine: Independent     Additional Measures  Special Tests: - Rasheed's test  Other: LEFS: 47/80     Exercises:   Exercises  Exercise 1: TA isometric 5s x

## 2018-03-26 NOTE — PLAN OF CARE
functional mobility. [] yes  [x] no   Long term goal 2: Patient will ambulate for community distances with improved DELFINA and bilateral LE symmetry. Ambulation 1  Surface: carpet  Device: No Device  Assistance: Modified Independent  Quality of Gait: narrow base of support, decreased stance time on left LE, antalgic gait pattern  Distance: clinical distance in department    [] yes  [x] no   Long term goal 3: LEFS will be >/=58/80 to demonstrate functional improvements. LEFS: 47/80 [] yes  [x] no       Body structures, Functions, Activity limitations: Decreased functional mobility , Decreased strength, Decreased endurance, Decreased balance  Assessment: Patient demonstrates decline in ambulation tolerance due to increased pain in left hip starting 10 days ago. Patient reports a fall onto left hip earlier that may have contributed to current problem. Upon PT evaluation, patient demonstrates decreased bilateral hip and core strength. Further skilled PT recommended to improve strength and ambulation to return to prior level of function. Prognosis: Good      New Education Provided: goals of PT, HEP  G-Codes  PT G-Codes  Functional Assessment Tool Used: LEFS and clinical judgement  Score: LEFS: 47/80; 41% disability  Functional Limitation: Mobility: Walking and moving around  Mobility: Walking and Moving Around Current Status (): At least 40 percent but less than 60 percent impaired, limited or restricted  Mobility: Walking and Moving Around Goal Status ():  At least 20 percent but less than 40 percent impaired, limited or restricted    PLAN: [x] Evaluate and Treat  Frequency/Duration:  Plan  Times per week: 2  Plan weeks: 5 weeks  Current Treatment Recommendations: Strengthening, Neuromuscular Re-education, Endurance Training, Balance Training, Gait Training, Stair training, Home Exercise Program, Safety Education & Training, Manual Therapy - Soft Tissue Mobilization, Manual Therapy - Joint Manipulation,

## 2018-04-02 ENCOUNTER — HOSPITAL ENCOUNTER (OUTPATIENT)
Dept: PHYSICAL THERAPY | Age: 68
Setting detail: THERAPIES SERIES
Discharge: HOME | End: 2018-04-02
Payer: MEDICARE

## 2018-04-02 PROCEDURE — 97110 THERAPEUTIC EXERCISES: CPT

## 2018-04-05 ENCOUNTER — APPOINTMENT (OUTPATIENT)
Dept: PHYSICAL THERAPY | Age: 68
End: 2018-04-05
Payer: MEDICARE

## 2018-04-05 ENCOUNTER — HOSPITAL ENCOUNTER (OUTPATIENT)
Dept: PHYSICAL THERAPY | Age: 68
Setting detail: THERAPIES SERIES
Discharge: HOME | End: 2018-04-05
Payer: MEDICARE

## 2018-04-05 PROCEDURE — 97110 THERAPEUTIC EXERCISES: CPT

## 2018-04-05 ASSESSMENT — PAIN SCALES - GENERAL: PAINLEVEL_OUTOF10: 1

## 2018-04-05 ASSESSMENT — PAIN DESCRIPTION - LOCATION: LOCATION: HIP

## 2018-04-05 ASSESSMENT — PAIN DESCRIPTION - ORIENTATION: ORIENTATION: LEFT

## 2018-04-05 ASSESSMENT — PAIN DESCRIPTION - PAIN TYPE: TYPE: ACUTE PAIN

## 2018-04-09 ENCOUNTER — HOSPITAL ENCOUNTER (OUTPATIENT)
Dept: PHYSICAL THERAPY | Age: 68
Setting detail: THERAPIES SERIES
Discharge: HOME | End: 2018-04-09
Payer: MEDICARE

## 2018-04-09 PROCEDURE — 97110 THERAPEUTIC EXERCISES: CPT

## 2018-04-09 ASSESSMENT — PAIN DESCRIPTION - DESCRIPTORS: DESCRIPTORS: ACHING

## 2018-04-09 ASSESSMENT — PAIN DESCRIPTION - PAIN TYPE: TYPE: ACUTE PAIN

## 2018-04-09 ASSESSMENT — PAIN DESCRIPTION - ORIENTATION: ORIENTATION: LEFT

## 2018-04-09 ASSESSMENT — PAIN SCALES - GENERAL: PAINLEVEL_OUTOF10: 1

## 2018-04-09 ASSESSMENT — PAIN DESCRIPTION - LOCATION: LOCATION: HIP

## 2018-04-12 ENCOUNTER — HOSPITAL ENCOUNTER (OUTPATIENT)
Dept: PHYSICAL THERAPY | Age: 68
Setting detail: THERAPIES SERIES
Discharge: HOME | End: 2018-04-12
Payer: MEDICARE

## 2018-04-16 ENCOUNTER — HOSPITAL ENCOUNTER (OUTPATIENT)
Dept: PHYSICAL THERAPY | Age: 68
Setting detail: THERAPIES SERIES
Discharge: HOME | End: 2018-04-16
Payer: MEDICARE

## 2018-04-16 PROCEDURE — 97110 THERAPEUTIC EXERCISES: CPT

## 2018-04-16 ASSESSMENT — PAIN DESCRIPTION - FREQUENCY: FREQUENCY: INTERMITTENT

## 2018-04-16 ASSESSMENT — PAIN SCALES - GENERAL: PAINLEVEL_OUTOF10: 1

## 2018-04-16 ASSESSMENT — PAIN DESCRIPTION - LOCATION: LOCATION: HIP

## 2018-04-16 ASSESSMENT — PAIN DESCRIPTION - ORIENTATION: ORIENTATION: LEFT

## 2018-04-16 ASSESSMENT — PAIN DESCRIPTION - PAIN TYPE: TYPE: ACUTE PAIN

## 2018-04-19 ENCOUNTER — HOSPITAL ENCOUNTER (OUTPATIENT)
Dept: PHYSICAL THERAPY | Age: 68
Setting detail: THERAPIES SERIES
Discharge: HOME | End: 2018-04-19
Payer: MEDICARE

## 2018-04-19 PROCEDURE — 97110 THERAPEUTIC EXERCISES: CPT

## 2018-04-19 ASSESSMENT — PAIN DESCRIPTION - LOCATION: LOCATION: HIP

## 2018-04-19 ASSESSMENT — PAIN DESCRIPTION - PAIN TYPE: TYPE: ACUTE PAIN

## 2018-04-19 ASSESSMENT — PAIN SCALES - GENERAL: PAINLEVEL_OUTOF10: 3

## 2018-04-19 ASSESSMENT — PAIN DESCRIPTION - ORIENTATION: ORIENTATION: LEFT

## 2018-04-19 ASSESSMENT — PAIN DESCRIPTION - DESCRIPTORS: DESCRIPTORS: ACHING

## 2018-04-23 ENCOUNTER — HOSPITAL ENCOUNTER (OUTPATIENT)
Dept: PHYSICAL THERAPY | Age: 68
Setting detail: THERAPIES SERIES
Discharge: HOME | End: 2018-04-23
Payer: MEDICARE

## 2018-04-23 PROCEDURE — G8979 MOBILITY GOAL STATUS: HCPCS

## 2018-04-23 PROCEDURE — G8980 MOBILITY D/C STATUS: HCPCS

## 2018-04-23 PROCEDURE — 97110 THERAPEUTIC EXERCISES: CPT

## 2018-04-26 ENCOUNTER — APPOINTMENT (OUTPATIENT)
Dept: PHYSICAL THERAPY | Age: 68
End: 2018-04-26
Payer: MEDICARE

## 2018-04-30 ENCOUNTER — APPOINTMENT (OUTPATIENT)
Dept: PHYSICAL THERAPY | Age: 68
End: 2018-04-30
Payer: MEDICARE

## 2021-10-07 ENCOUNTER — HOSPITAL ENCOUNTER (OUTPATIENT)
Dept: WOMENS IMAGING | Age: 71
Discharge: HOME | End: 2021-10-09
Payer: MEDICARE

## 2021-10-07 VITALS — HEIGHT: 66 IN | BODY MASS INDEX: 28.5 KG/M2

## 2021-10-07 DIAGNOSIS — Z12.31 BREAST CANCER SCREENING BY MAMMOGRAM: ICD-10-CM

## 2021-10-07 PROCEDURE — 77063 BREAST TOMOSYNTHESIS BI: CPT

## 2023-01-04 ENCOUNTER — HOSPITAL ENCOUNTER (OUTPATIENT)
Dept: WOMENS IMAGING | Age: 73
Discharge: HOME | End: 2023-01-06
Payer: MEDICARE

## 2023-01-04 DIAGNOSIS — Z12.31 VISIT FOR SCREENING MAMMOGRAM: ICD-10-CM

## 2023-01-04 PROCEDURE — 77067 SCR MAMMO BI INCL CAD: CPT

## 2024-01-25 ENCOUNTER — TRANSCRIBE ORDERS (OUTPATIENT)
Dept: ADMINISTRATIVE | Age: 74
End: 2024-01-25

## 2024-01-25 DIAGNOSIS — Z12.31 SCREENING MAMMOGRAM, ENCOUNTER FOR: Primary | ICD-10-CM

## 2024-02-09 ENCOUNTER — HOSPITAL ENCOUNTER (OUTPATIENT)
Dept: WOMENS IMAGING | Age: 74
End: 2024-02-09
Payer: MEDICARE

## 2024-02-09 VITALS — BODY MASS INDEX: 28.08 KG/M2 | HEIGHT: 67 IN

## 2024-02-09 DIAGNOSIS — Z12.31 SCREENING MAMMOGRAM, ENCOUNTER FOR: ICD-10-CM

## 2024-02-09 PROCEDURE — 77063 BREAST TOMOSYNTHESIS BI: CPT

## 2024-06-14 ENCOUNTER — HOSPITAL ENCOUNTER (OUTPATIENT)
Dept: RADIOLOGY | Facility: CLINIC | Age: 74
Discharge: HOME | End: 2024-06-14
Payer: MEDICARE

## 2024-06-14 ENCOUNTER — OFFICE VISIT (OUTPATIENT)
Dept: ORTHOPEDIC SURGERY | Facility: CLINIC | Age: 74
End: 2024-06-14
Payer: MEDICARE

## 2024-06-14 DIAGNOSIS — M79.641 RIGHT HAND PAIN: ICD-10-CM

## 2024-06-14 DIAGNOSIS — S62.619A CLOSED FRACTURE OF BASE OF PROXIMAL PHALANX OF FINGER: Primary | ICD-10-CM

## 2024-06-14 PROCEDURE — 73130 X-RAY EXAM OF HAND: CPT | Mod: RT

## 2024-06-14 PROCEDURE — 26720 TREAT FINGER FRACTURE EACH: CPT | Performed by: STUDENT IN AN ORGANIZED HEALTH CARE EDUCATION/TRAINING PROGRAM

## 2024-06-14 PROCEDURE — 99214 OFFICE O/P EST MOD 30 MIN: CPT | Mod: 57 | Performed by: STUDENT IN AN ORGANIZED HEALTH CARE EDUCATION/TRAINING PROGRAM

## 2024-06-14 PROCEDURE — 99214 OFFICE O/P EST MOD 30 MIN: CPT | Performed by: STUDENT IN AN ORGANIZED HEALTH CARE EDUCATION/TRAINING PROGRAM

## 2024-06-14 RX ORDER — NAPROXEN 500 MG/1
500 TABLET ORAL
Qty: 28 TABLET | Refills: 0 | Status: SHIPPED | OUTPATIENT
Start: 2024-06-14 | End: 2024-06-28

## 2024-06-14 NOTE — PROGRESS NOTES
Acute Injury Established Patient Visit    HPI: Sol is a 74 y.o.female who presents today with new complaints of right hand injury.  She states that about a week ago she was swimming in the pool and hyperextended her ring finger back.  She has been having some pain and swelling in the ring finger.  She has not been able to take her ring off.  She is having some tingling into her finger.  She has been trying some ice and ibuprofen.    Plan: For this right proximal phalanx nondisplaced fracture on the ring finger, we will place her in a wrist free ulnar gutter brace, remove the ring, and start her on naproxen.  She will follow-up in 2 to 3 weeks for repeat x-rays and we can hopefully get her out of the brace and into early range of motion.  We are able to successfully remove the ring on her finger is feeling much better with resolution of her paresthesia.    Assessment:   Problem List Items Addressed This Visit    None  Visit Diagnoses       Closed fracture of base of proximal phalanx of finger    -  Primary    Relevant Medications    naproxen (Naprosyn) 500 mg tablet    Other Relevant Orders    Boxer Fracture Brace    Right hand pain        Relevant Orders    XR hand right 3+ views            Diagnostics: X-ray of the right finger reveals a nondisplaced base of the proximal phalanx fracture of the right ring finger.      Procedure:  Procedures    Physical Exam:  GENERAL:  No obvious acute distress.  NEURO:  Distally neurovascularly intact.  Sensation intact to light touch.  Extremity: Exam of the right hand reveals skin is intact with no signs of infection.  There is tenderness at the base of the proximal phalanx of the ring finger.  There is some swelling distal to her ring on the ring finger.  She has some numbness at the tip of the finger.  This resolved after removing her ring.  There is no rotational deformity or scissoring.    Orders Placed This Encounter    Boxer Fracture Brace    XR hand right 3+ views     naproxen (Naprosyn) 500 mg tablet      At the conclusion of the visit there were no further questions by the patient/family regarding their plan of care.  Patient was instructed to call or return with any issues, questions, or concerns regarding their injury and/or treatment plan described above.     06/14/24 at 2:32 PM - Evangelista Fabian, DO    Office: [phone]    This note was prepared using voice recognition software.  The details of this note are correct and have been reviewed, and corrected to the best of my ability.  Some grammatical errors may persist related to the Dragon software.

## 2024-07-09 ENCOUNTER — APPOINTMENT (OUTPATIENT)
Dept: ORTHOPEDIC SURGERY | Facility: CLINIC | Age: 74
End: 2024-07-09
Payer: MEDICARE

## 2024-07-09 ENCOUNTER — HOSPITAL ENCOUNTER (OUTPATIENT)
Dept: RADIOLOGY | Facility: HOSPITAL | Age: 74
Discharge: HOME | End: 2024-07-09
Payer: MEDICARE

## 2024-07-09 ENCOUNTER — OFFICE VISIT (OUTPATIENT)
Dept: ORTHOPEDIC SURGERY | Facility: CLINIC | Age: 74
End: 2024-07-09
Payer: MEDICARE

## 2024-07-09 DIAGNOSIS — S62.619A CLOSED FRACTURE OF BASE OF PROXIMAL PHALANX OF FINGER: ICD-10-CM

## 2024-07-09 PROCEDURE — 73130 X-RAY EXAM OF HAND: CPT | Mod: RIGHT SIDE | Performed by: RADIOLOGY

## 2024-07-09 PROCEDURE — 99024 POSTOP FOLLOW-UP VISIT: CPT | Performed by: STUDENT IN AN ORGANIZED HEALTH CARE EDUCATION/TRAINING PROGRAM

## 2024-07-09 PROCEDURE — 73130 X-RAY EXAM OF HAND: CPT | Mod: RT

## 2024-07-09 NOTE — PROGRESS NOTES
Established follow-up patient Visit    HPI: Sol is a 74 y.o.female who presents today for follow-up of her right proximal phalanx nondisplaced fracture of the ring finger.  She states that she is feeling significantly better.  She denies any interval injuries.  She does note some stiffness in the finger and the wrist.    On 6/14/2024, she presented with new complaints of right hand injury.  She states that about a week ago she was swimming in the pool and hyperextended her ring finger back.  She has been having some pain and swelling in the ring finger.  She has not been able to take her ring off.  She is having some tingling into her finger.  She has been trying some ice and ibuprofen.    Plan: Today, on 7/9/2024, we can start to transition her out of her ulnar gutter brace and have her start to work on some range of motion.  She can return to the brace when she is out in public and in a position where she can fall and reinjure the hand, but can begin to remove the brace most of the time.  Will consider OT if she is not improving on her stiffness at follow-up.  Follow-up in 3 weeks.    On 6/14/2024, for this right proximal phalanx nondisplaced fracture on the ring finger, we will place her in a wrist free ulnar gutter brace, remove the ring, and start her on naproxen.  She will follow-up in 2 to 3 weeks for repeat x-rays and we can hopefully get her out of the brace and into early range of motion.  We are able to successfully remove the ring on her finger is feeling much better with resolution of her paresthesia.    Assessment:   Problem List Items Addressed This Visit    None  Visit Diagnoses       Closed fracture of base of proximal phalanx of finger        Relevant Orders    XR hand right 3+ views            Diagnostics: X-ray of the right finger reveals interval healing of this nondisplaced base of the proximal phalanx fracture of the right ring finger.      Procedure:  Procedures    Physical Exam:  GENERAL:   No obvious acute distress.  NEURO:  Distally neurovascularly intact.  Sensation intact to light touch.  Extremity: Exam of the right hand reveals skin is intact with no signs of infection.  There is minimal tenderness at the base of the proximal phalanx of the ring finger.  There is no swelling distal to her ring on the ring finger.  She has no numbness at the tip of the finger.  This resolved after removing her ring.  There is no rotational deformity or scissoring.    Orders Placed This Encounter    XR hand right 3+ views      At the conclusion of the visit there were no further questions by the patient/family regarding their plan of care.  Patient was instructed to call or return with any issues, questions, or concerns regarding their injury and/or treatment plan described above.     07/09/24 at 3:26 PM - Evangelista Fabian,     Office: [phone]    This note was prepared using voice recognition software.  The details of this note are correct and have been reviewed, and corrected to the best of my ability.  Some grammatical errors may persist related to the Dragon software.

## 2024-07-29 ENCOUNTER — APPOINTMENT (OUTPATIENT)
Dept: ORTHOPEDIC SURGERY | Facility: CLINIC | Age: 74
End: 2024-07-29
Payer: MEDICARE

## 2024-07-31 ENCOUNTER — APPOINTMENT (OUTPATIENT)
Dept: ORTHOPEDIC SURGERY | Facility: CLINIC | Age: 74
End: 2024-07-31
Payer: MEDICARE

## 2024-07-31 DIAGNOSIS — S62.619A CLOSED FRACTURE OF BASE OF PROXIMAL PHALANX OF FINGER: Primary | ICD-10-CM

## 2024-07-31 PROCEDURE — 1159F MED LIST DOCD IN RCRD: CPT | Performed by: STUDENT IN AN ORGANIZED HEALTH CARE EDUCATION/TRAINING PROGRAM

## 2024-07-31 PROCEDURE — 99024 POSTOP FOLLOW-UP VISIT: CPT | Performed by: STUDENT IN AN ORGANIZED HEALTH CARE EDUCATION/TRAINING PROGRAM

## 2024-07-31 NOTE — PROGRESS NOTES
Established follow-up patient Visit    HPI: Sol is a 74 y.o.female who presents today for follow-up of her right proximal phalanx nondisplaced fracture of the ring finger.  He states that she has been out of her brace and back to range of motion exercises.  She was able to swim mild today.  She is feeling 100% better.  She denies any interval injuries.  She denies any swelling or bruising.  She denies any numbness and tingling.    On 7/9/2024, she presented for follow-up of her right proximal phalanx nondisplaced fracture of the ring finger.  She states that she is feeling significantly better.  She denies any interval injuries.  She does note some stiffness in the finger and the wrist.    On 6/14/2024, she presented with new complaints of right hand injury.  She states that about a week ago she was swimming in the pool and hyperextended her ring finger back.  She has been having some pain and swelling in the ring finger.  She has not been able to take her ring off.  She is having some tingling into her finger.  She has been trying some ice and ibuprofen.    Plan: Today, on 7/31/2024, she can gradually return progressively to activities as tolerated.  Follow-up as needed.    On 7/9/2024, we can start to transition her out of her ulnar gutter brace and have her start to work on some range of motion.  She can return to the brace when she is out in public and in a position where she can fall and reinjure the hand, but can begin to remove the brace most of the time.  Will consider OT if she is not improving on her stiffness at follow-up.  Follow-up in 3 weeks.    On 6/14/2024, for this right proximal phalanx nondisplaced fracture on the ring finger, we will place her in a wrist free ulnar gutter brace, remove the ring, and start her on naproxen.  She will follow-up in 2 to 3 weeks for repeat x-rays and we can hopefully get her out of the brace and into early range of motion.  We are able to successfully remove the  ring on her finger is feeling much better with resolution of her paresthesia.    Assessment:   Problem List Items Addressed This Visit    None  Visit Diagnoses       Closed fracture of base of proximal phalanx of finger    -  Primary              Diagnostics: X-ray of the right finger reveals interval healing of this nondisplaced base of the proximal phalanx fracture of the right ring finger.      Procedure:  Procedures    Physical Exam:  GENERAL:  No obvious acute distress.  NEURO:  Distally neurovascularly intact.  Sensation intact to light touch.  Extremity: Exam of the right hand reveals skin is intact with no signs of infection.  There is no tenderness at the base of the proximal phalanx of the ring finger.  There is no swelling distal to her ring on the ring finger.  She has no numbness at the tip of the finger.  This resolved after removing her ring.  There is no rotational deformity or scissoring.    No orders of the defined types were placed in this encounter.     At the conclusion of the visit there were no further questions by the patient/family regarding their plan of care.  Patient was instructed to call or return with any issues, questions, or concerns regarding their injury and/or treatment plan described above.     07/31/24 at 3:32 PM - Evangelista Fabian DO    Office: [phone]    This note was prepared using voice recognition software.  The details of this note are correct and have been reviewed, and corrected to the best of my ability.  Some grammatical errors may persist related to the Dragon software.

## 2024-09-03 ENCOUNTER — HOSPITAL ENCOUNTER (OUTPATIENT)
Dept: PHYSICAL THERAPY | Age: 74
Setting detail: THERAPIES SERIES
Discharge: HOME | End: 2024-09-03
Payer: MEDICARE

## 2024-09-03 PROCEDURE — 97162 PT EVAL MOD COMPLEX 30 MIN: CPT

## 2024-09-03 NOTE — THERAPY EVALUATION
falls per DGI.  Pt ambulates with a Lt Trendelenburg due to hip weakness and decreased Lt DF at initial contact increasing her risk for tripping.  Pt requires deviations to complete sit to stand transfers with decreased UE use and to complete floor transfers without pulling up on an object.  Pt reports exercising 5 hours per day 5 days per week - discussed cutting back as pt's body may not be fully recovering limiting her improvements in strength.  Pt would benefit from further skilled PT to improver her strength and balance to reduce her risk for falls at home.    Outcome Measure:    Dynamic Gait Total Score: 15    Evaluation Complexity:   Decision Making Medium   History High   Exam High   Clinical Presentation Medium      Barriers to this patient's plan of care or recovery include: Chronicity of problem    Goals  Patient Goal(s):      Short Term Goals Completed by 3 weeks Goal Status   STG 1Independent with HEP. New   STG 2 Pt will be able to complete a STS transfer from a chair without UEs with a normal DELFINA S/I. New     Long Term Goals Completed by 6 weeks Goal Status   LTG 1 Improve bhavana LE strength by >/= 1 MMT grade to improve transfers and mobility. New   LTG 2 Pt will ambulate >/= 250' on even and uneven surfaces with improved foot clearance and stability S/I. New   LTG 3 DGI >/= 21/24 to reduce pt's risk for falls. New   LTG 4 5xSTS from a chair without UEs </= 15sec to improve pt's functional strength. New   LTG 5 Pt will complete floor transfers through 1/2 kneel with increased ease and decreased time S/I. New     Rehab Potential: []Excellent [x] Good  [] Fair  [] Poor [] Guarded    TREATMENT PLAN   Plan         Treatment may include any combination of the following: Strengthening, ROM, Balance training, Functional mobility training, Transfer training, Stair training, Gait training, Neuromuscular re-education, Manual, Home exercise program, Safety education & training, Patient/Caregiver education &

## 2024-09-18 ENCOUNTER — APPOINTMENT (OUTPATIENT)
Dept: OTOLARYNGOLOGY | Facility: CLINIC | Age: 74
End: 2024-09-18
Payer: MEDICARE

## 2024-09-18 VITALS — WEIGHT: 172 LBS | HEIGHT: 64 IN | BODY MASS INDEX: 29.37 KG/M2

## 2024-09-18 DIAGNOSIS — H65.07 RECURRENT ACUTE SEROUS OTITIS MEDIA, UNSPECIFIED LATERALITY: Primary | ICD-10-CM

## 2024-09-18 PROCEDURE — 1036F TOBACCO NON-USER: CPT | Performed by: OTOLARYNGOLOGY

## 2024-09-18 PROCEDURE — 1159F MED LIST DOCD IN RCRD: CPT | Performed by: OTOLARYNGOLOGY

## 2024-09-18 PROCEDURE — 1160F RVW MEDS BY RX/DR IN RCRD: CPT | Performed by: OTOLARYNGOLOGY

## 2024-09-18 PROCEDURE — 3008F BODY MASS INDEX DOCD: CPT | Performed by: OTOLARYNGOLOGY

## 2024-09-18 PROCEDURE — 99203 OFFICE O/P NEW LOW 30 MIN: CPT | Performed by: OTOLARYNGOLOGY

## 2024-09-18 NOTE — PROGRESS NOTES
Sol Nash is a 74 y.o. year old female patient with RIGHT EAR PAIN     Patient presents to the office today for assessment of ears.  Patient has been experiencing reported recurrent ear infections.  She does have prior history of tubes as an adult at the age of 66.  She states that this summer she has had 2-3 ear infections experiencing pain during these infections but no reported hearing changes.  She was experiencing some discomfort in the left ear today.  All other ENT related concerns are negative.      Review of Systems   All other systems reviewed and are negative.        Physical Exam:   No acute distress  The external ear structures appear normal. The ear canals patent and the tympanic membranes are intact without evidence of air-fluid levels, retraction, or congenital defects.  Anterior rhinoscopy notes essentially a midline nasal septum. Examination is noted for normal healthy mucosal membranes without any evidence of lesions, polyps, or exudate. The tongue is normally mobile. There are no lesions on the gingiva, buccal, or oral mucosa. There are no oral cavity masses.  The neck is negative for mass lymphadenopathy. The trachea and parotid are clear. The thyroid bed is grossly unremarkable. The salivary gland structures are grossly unremarkable.    Assessment/Plan   Recurrent otitis media   Patient presented to the office for assessment of ears for reported recurrent ear infections with otherwise unremarkable appearance.  The patient was given reassurance.  I recommend utilization of nasal steroids and see us back should something flare.

## 2024-09-30 ENCOUNTER — HOSPITAL ENCOUNTER (OUTPATIENT)
Dept: PHYSICAL THERAPY | Age: 74
Setting detail: THERAPIES SERIES
Discharge: HOME | End: 2024-09-30
Payer: MEDICARE

## 2024-09-30 PROCEDURE — 97110 THERAPEUTIC EXERCISES: CPT

## 2024-09-30 PROCEDURE — 97112 NEUROMUSCULAR REEDUCATION: CPT

## 2024-09-30 ASSESSMENT — PAIN DESCRIPTION - ORIENTATION: ORIENTATION: LEFT

## 2024-09-30 ASSESSMENT — PAIN SCALES - GENERAL: PAINLEVEL_OUTOF10: 1

## 2024-09-30 ASSESSMENT — PAIN DESCRIPTION - LOCATION: LOCATION: RIB CAGE

## 2024-09-30 NOTE — PROGRESS NOTES
education & training, Equipment evaluation, education, & procurement, Modalities  Modalities: Heat/Cold, E-stim - unattended  Pt to continue current HEP.  See objective section for any therapeutic exercise changes, additions or modifications this date.    Therapy Time:      PT Individual Minutes  Time In: 1500  Time Out: 1556  Minutes: 56  Timed Code Treatment Minutes: 56 Minutes  Procedure Minutes:0  Timed Activity Minutes Units   Ther Ex 30 2   Neuro Bin 26 2     Electronically signed by Shira Mckinney PTA on 9/30/24 at 4:09 PM EDT

## 2024-10-03 ENCOUNTER — HOSPITAL ENCOUNTER (OUTPATIENT)
Dept: PHYSICAL THERAPY | Age: 74
Setting detail: THERAPIES SERIES
Discharge: HOME | End: 2024-10-03
Payer: MEDICARE

## 2024-10-03 PROCEDURE — 97116 GAIT TRAINING THERAPY: CPT

## 2024-10-03 PROCEDURE — 97110 THERAPEUTIC EXERCISES: CPT

## 2024-10-03 PROCEDURE — 97112 NEUROMUSCULAR REEDUCATION: CPT

## 2024-10-03 PROCEDURE — 97535 SELF CARE MNGMENT TRAINING: CPT

## 2024-10-03 NOTE — PROGRESS NOTES
[facility]  Outpatient Physical Therapy    Treatment Note        Date: 10/3/2024  Patient: Dorothy Mireles  : 1950   Confirmed: Yes  MRN: [id number]  Referring Provider: Evan Culp APRN - CNP    Medical Diagnosis: Other abnormalities of gait and mobility [R26.89]  Repeated falls [R29.6]       Treatment Diagnosis: Frequent falls, Proximal muscle weakness    Visit Information:  Insurance: Payor: [facility] MEDICARE / Plan: [facility] MEDICARE COMPLETE / Product Type: *No Product type* /   PT Visit Information  PT Insurance Information: UHC Medicare  Total # of Visits Approved: 12  Total # of Visits to Date: 3 (corrected 10/3)  No Show: 0  Progress Note Due Date: 10/03/24  Canceled Appointment: 0  Progress Note Counter:  Until 10/22/24    Subjective Information:  Subjective: Pt states she could not perform STS today but on Tuesday she did 20 reps  HEP Compliance:  [x] Good [] Fair [] Poor [] Reports not doing due to:    Pain Screening  Patient Currently in Pain: Denies    Treatment:  Exercises:  Exercises  Exercise 4: Gait drills: /March/ tandem, 0-2 Ue support  Exercise 8: 2 way SLR x 12, Hip ext x10,attempted Circles( cw/ccw) x 5-10 reps  Exercise 9: Bridges 5\" X 10, Pball hamcurls x 12  Exercise 10: STS from chair with ue use x 5= 16.45s  Exercise 17: Hip flexor str* QS* NV  Exercise 18: obj measures  Exercise 19: floor transfers through 1/2 kneel with UEs pushing through chair  Exercise 20: HEP: cont current     *Indicates exercise, modality, or manual techniques to be initiated when appropriate    Objective Measures:      Transfers  Comment: floor transfers through 1/2 kneel with UEs pushing through chair    Ambulation  Surface: Outdoors  Device: No Device  Assistance: Independent  Quality of Gait: Lt Trendelenburg, decreased trunk rotation, mild decreased Lt DF at initial contact  Gait Deviations: Slow Minnie, Increased DELFINA, Deviated path  Distance: 200ft  Comments: gravel and grass as well as 
Deviations: Slow Minnie, Increased DELFINA, Deviated path  Distance: 200ft  Comments: gravel and grass as well as level surface           In progress   Long Term Goal 3: DGI >/= 21/24 to reduce pt's risk for falls.    DGI = 16 In progress   Long Term Goal 4: 5xSTS from a chair without UEs </= 15sec to improve pt's functional strength. LTG 4 Current Status:: 10/3: 5 x STS=16.45s from chair with UE use   In progress   Long Term Goal 5: Pt will complete floor transfers through 1/2 kneel with increased ease and decreased time S/I. LTG 5 Current Status:: 10/3:floor transfers through 1/2 kneel with UEs pushing through chair   In progress     Body Structures, Functions, Activity Limitations Requiring Skilled Therapeutic Intervention: Decreased functional mobility , Decreased strength, Decreased balance, Decreased coordination  Assessment: Pt has demonstrated a small increase in hip strengthening and floor transfers despite minimal attendance since eval.Pt is haviing difficulty with STS w/o UE use today and advised pt top break up reps throughout the day for tolerance. Pt is making progress towards goals and will benefit from continued therapy to progress dynamic balance and strength to acheive current goals.  Therapy Prognosis: Good  Rehab Potential: []Excellent [] Good  [] Fair  [] Poor [] Guarded         PLAN: [] Evaluate and Treat  Frequency/Duration:  Plan Frequency: 2  Plan weeks: 6  Current Treatment Recommendations: Strengthening, ROM, Balance training, Functional mobility training, Transfer training, Stair training, Gait training, Neuromuscular re-education, Manual, Home exercise program, Safety education & training, Patient/Caregiver education & training, Equipment evaluation, education, & procurement, Modalities  Modalities: Heat/Cold, E-stim - unattended     Precautions:                            Patient Status:[x] Continue/ Initiate plan of Care     [] Discharge PT.  Recommend pt continue with HEP.      []

## 2024-10-07 ENCOUNTER — HOSPITAL ENCOUNTER (OUTPATIENT)
Dept: PHYSICAL THERAPY | Age: 74
Setting detail: THERAPIES SERIES
Discharge: HOME | End: 2024-10-07
Payer: MEDICARE

## 2024-10-07 PROCEDURE — 97110 THERAPEUTIC EXERCISES: CPT

## 2024-10-07 PROCEDURE — 97112 NEUROMUSCULAR REEDUCATION: CPT

## 2024-10-07 NOTE — PROGRESS NOTES
training, Gait training, Neuromuscular re-education, Manual, Home exercise program, Safety education & training, Patient/Caregiver education & training, Equipment evaluation, education, & procurement, Modalities  Modalities: Heat/Cold, E-stim - unattended  Pt to continue current HEP.  See objective section for any therapeutic exercise changes, additions or modifications this date.    Therapy Time:      PT Individual Minutes  Time In: 1501  Time Out: 1556  Minutes: 55  Timed Code Treatment Minutes: 55 Minutes  Procedure Minutes: 0    Timed Activity Minutes Units   Ther Ex 20 1   Neuro Bin 35 3     Electronically signed by Lindy Taylor, PT on 10/7/24 at 4:06 PM EDT

## 2024-10-10 ENCOUNTER — HOSPITAL ENCOUNTER (OUTPATIENT)
Dept: PHYSICAL THERAPY | Age: 74
Setting detail: THERAPIES SERIES
Discharge: HOME | End: 2024-10-10
Payer: MEDICARE

## 2024-10-10 PROCEDURE — 97112 NEUROMUSCULAR REEDUCATION: CPT

## 2024-10-10 PROCEDURE — 97110 THERAPEUTIC EXERCISES: CPT

## 2024-10-10 NOTE — PROGRESS NOTES
[facility]  Outpatient Physical Therapy   Treatment Note        Date: 10/10/2024  Patient: Dorothy Mireles  : 1950   Confirmed: Yes  MRN: [id number]  Referring Provider: Evan Culp APRN - CNP      Medical Diagnosis: Other abnormalities of gait and mobility [R26.89]  Repeated falls [R29.6]      Treatment Diagnosis: Frequent falls, Proximal muscle weakness    Visit Information:  Insurance: Payor: [facility] MEDICARE / Plan: [facility] MEDICARE COMPLETE / Product Type: *No Product type* /   PT Visit Information  PT Insurance Information: UHC Medicare  Total # of Visits Approved: 12  Total # of Visits to Date: 5  Plan of Care/Certification Expiration Date: 10/22/24  No Show: 0  Progress Note Due Date: 24  Canceled Appointment: 0  Progress Note Counter:     Subjective Information:  Subjective: Patient reports she felt ok after the other day, tried raising the seat heights which was better for her transfers.  HEP Compliance:  [x] Good [] Fair [] Poor [] Reports not doing due to:    Pain Screening  Patient Currently in Pain: Denies    Treatment:  Exercises:  Exercises  Exercise 1: Single stepping over 3\" box fwd / lat x15 reps, bhavana  Exercise 2: static balance: FT-EO ; FT-EC; FT-V/H head turns ; tandem stance, bhavana x30\" each  Exercise 3: 3-way Vector tapping on colored dots x10 reps, bhavana, single UE support  Exercise 5: seated lateral hip march over 3\" box  x10 reps, bhavana  Exercise 7: seated hip add with ball 10 reps x 5 sec holds  Exercise 10: staggered STS from chair (with foam pad) x5 reps, bhavana  Exercise 11: 4\" F/L step-ups x15 reps, bhavana, each  Exercise 12: 3\" alt toe taps 2 sets x 20 reps  Exercise 13: seated ankle DF and EV RTB x15 reps, bhavana  Exercise 18: rolling 2# ball around opp leg x5 reps, bhavana  Exercise 20: HEP: cont current + seated lateral hip march & hip add    *Indicates exercise, modality, or manual techniques to be initiated when appropriate    Objective Measures:     STG 2 Current Status::

## 2024-10-15 ENCOUNTER — APPOINTMENT (OUTPATIENT)
Dept: PHYSICAL THERAPY | Age: 74
End: 2024-10-15
Payer: MEDICARE

## 2024-10-16 ENCOUNTER — HOSPITAL ENCOUNTER (OUTPATIENT)
Dept: PHYSICAL THERAPY | Age: 74
Setting detail: THERAPIES SERIES
Discharge: HOME | End: 2024-10-16
Payer: MEDICARE

## 2024-10-16 NOTE — PROGRESS NOTES
Therapy                            Cancellation/No-show Note    Date: 10/16/2024  Patient: Dorothy Mireles (74 y.o. female)  : 1950  MRN:  [id number]  Referring Physician: Evan Culp APRN - CNP    Medical Diagnosis: Other abnormalities of gait and mobility [R26.89]  Repeated falls [R29.6]      Visit Information:  Insurance: Payor: [facility] MEDICARE / Plan: [facility] MEDICARE COMPLETE / Product Type: *No Product type* /   Visits to Date: 5   No Show/Cancelled Appts: 0 /       For today's appointment patient:  [x]  Cancelled  []  Rescheduled appointment  []  No-show   []  Called pt to remind of next appointment     Reason given by patient:  []  Patient ill  []  Conflicting appointment  []  No transportation    []  Conflict with work  []  No reason given  [x]  Other:  out of work     [x] Pt has future appointments scheduled, no follow up needed  [] Pt requests to be on hold.    Reason:   If > 2 weeks please discuss with therapist.  [] Therapist to call pt for follow up  [] [place] to call pt to reschedule   Comments:       Signature: Electronically signed by Zo Segovia PTA on 10/16/24 at 10:29 AM EDT

## 2024-10-17 ENCOUNTER — APPOINTMENT (OUTPATIENT)
Dept: PHYSICAL THERAPY | Age: 74
End: 2024-10-17
Payer: MEDICARE

## 2024-10-22 ENCOUNTER — HOSPITAL ENCOUNTER (OUTPATIENT)
Dept: PHYSICAL THERAPY | Age: 74
Setting detail: THERAPIES SERIES
Discharge: HOME | End: 2024-10-22
Payer: MEDICARE

## 2024-10-22 PROCEDURE — 97112 NEUROMUSCULAR REEDUCATION: CPT

## 2024-10-22 PROCEDURE — 97110 THERAPEUTIC EXERCISES: CPT

## 2024-10-22 NOTE — PROGRESS NOTES
[facility]  Outpatient Physical Therapy    Treatment Note        Date: 10/22/2024  Patient: Dorothy Mireles  : 1950   Confirmed: Yes  MRN: [id number]  Referring Provider: Evan Culp APRN - CNP    Medical Diagnosis: Other abnormalities of gait and mobility [R26.89]  Repeated falls [R29.6]       Treatment Diagnosis: Frequent falls, Proximal muscle weakness    Visit Information:  Insurance: Payor: [facility] MEDICARE / Plan: [facility] MEDICARE COMPLETE / Product Type: *No Product type* /   PT Visit Information  PT Insurance Information: [employer]HC Medicare  Total # of Visits Approved: 12  Total # of Visits to Date: 5  Plan of Care/Certification Expiration Date: 10/22/24  No Show: 0  Progress Note Due Date: 24  Canceled Appointment: 0  Progress Note Counter:     Subjective Information:  Subjective: Patient continues to c/o difficulty getting up and down from the floor. Notes she continues to have frequent falls. Most recently fell due to her grandson running into her.  HEP Compliance:  [x] Good [] Fair [] Poor [] Reports not doing due to:  Pain Screening  Patient Currently in Pain: Denies    Treatment:  Exercises:  Exercises  Exercise 1: BOSU lunges forward/lateral x10 B  Exercise 2: RDL 5# DB x10  Exercise 3: resisted single steps at CC 2 plates x10 forward/lateral B  Exercise 4: walking march with RTB, lateral monster walks length // bars x10  Exercise 5: seated lateral hip march over 3\" box  x15 reps, bhavana  Exercise 6: floor transfers x2  Exercise 7: seated hip add with ball 10 reps x 5 sec holds  Exercise 8: lateral tap downs, forward tap downs 4\" step x10 B  Exercise 9: 2# ball toss to trampoline on foam 2x10  Exercise 10: STS without UE 3x5 progressively lowering table  Exercise 18: SLS with ball rolls on opp side up/down, circles cw/ccw  Assessment:   Body Structures, Functions, Activity Limitations Requiring Skilled Therapeutic Intervention: Decreased functional mobility , Decreased strength,

## 2024-11-04 ENCOUNTER — HOSPITAL ENCOUNTER (OUTPATIENT)
Dept: PHYSICAL THERAPY | Age: 74
Setting detail: THERAPIES SERIES
Discharge: HOME | End: 2024-11-04
Payer: MEDICARE

## 2024-11-04 PROCEDURE — 97110 THERAPEUTIC EXERCISES: CPT

## 2024-11-04 PROCEDURE — 97112 NEUROMUSCULAR REEDUCATION: CPT

## 2024-11-04 NOTE — PROGRESS NOTES
[facility]  Outpatient Physical Therapy   Treatment Note        Date: 2024  Patient: Dorothy Mireles  : 1950   Confirmed: Yes  MRN: [id number]  Referring Provider: Evan Culp APRN - CNP      Medical Diagnosis: Other abnormalities of gait and mobility [R26.89]  Repeated falls [R29.6]      Treatment Diagnosis: Frequent falls, Proximal muscle weakness    Visit Information:  Insurance: Payor: [facility] MEDICARE / Plan: [facility] MEDICARE COMPLETE / Product Type: *No Product type* /   PT Visit Information  PT Insurance Information: U[employer] Medicare  Total # of Visits Approved: 13 (12 then 8 used )  Total # of Visits to Date: 6  No Show: 0  Canceled Appointment: 0  Progress Note Counter:  (8 visits 10/23/24-24)    Subjective Information:  Subjective: Pt reports that she hasnt fallen since last visit.  HEP Compliance:  [x] Good [] Fair [] Poor [] Reports not doing due to:    Pain Screening  Patient Currently in Pain: Denies    Treatment:  Exercises:  Exercises  Exercise 4: fga task  Exercise 5: seated lateral hip march over 3\" box  x15 reps, pilo  Exercise 7: seated hip add with ball 10 reps x 5 sec holds  Exercise 8: seated on pball laq, march, circles x10 occ min assist  Exercise 9: quadruped le lifts, opp ue and le lifts, knee to arm(hip flexion)x10  Exercise 10: STS x5  Exercise 11: 4 way L hip YTB, RTB R x10  Exercise 13: objective measurments taken.    *Indicates exercise, modality, or manual techniques to be initiated when appropriate    Objective Measures:   STG 1 Current Status:: I with current program  STG 2 Current Status:: 24 able to sit to stand with no UE occ locking knees.    LTG 1 Current Status:: Pilo hip flexion 4/5 abduction and extension 4-5 , pilo knees flexion and extension 4+/5 ankle DF 4/5  LTG 2 Current Status:: 10/10: patient ambulates in clinic with lateral trunk lean and decreased foot clearance  LTG 3 Current Status:: DGI  with slight decrease in falls  LTG 4

## 2024-11-04 NOTE — PROGRESS NOTES
[facility]  PHYSICAL THERAPY PLAN OF CARE                                    [facility] [place] [address]     Ph: [phone]  Fax: [phone]    [] Certification  [] Recertification []  Plan of Care  [x] Progress Note [] Discharge      Referring Provider: Evan Culp APRN - CNP    From:  Majo Kemp PTA     Patient: Dorothy Mireles (74 y.o. female) : 1950 Date: 2024   Medical Diagnosis: Other abnormalities of gait and mobility [R26.89]  Repeated falls [R29.6]    Treatment Diagnosis: Frequent falls, Proximal muscle weakness      Progress Report Period from: 10/3/24 to 2024    Visits to Date: 6 No Show: 0 Cancelled Appts: 0    OBJECTIVE:   Short Term Goals - Time Frame for Short Term Goals: 3 weeks    Goals Current/Discharge status  Status   Short Term Goal 1: Independent with HEP.  STG 1 Current Status:: I with current program   In progress   Short Term Goal 2: Pt will be able to complete a STS transfer from a chair without UEs with a normal DELFINA S/I.  STG 2 Current Status:: 24 able to sit to stand with no UE occ locking knees.   Partially met     Long Term Goals - Time Frame for Long Term Goals : 6 weeks  Goals Current/ Discharge status Status   Long Term Goal 1: Improve bhavana LE strength by >/= 1 MMT grade to improve transfers and mobility. LTG 1 Current Status:: Bhavana hip flexion 4/5 abduction and extension 4-5 , bhavana knees flexion and extension 4+/5 ankle DF 4/5   In progress   Long Term Goal 2: Pt will ambulate >/= 250' on even and uneven surfaces with improved foot clearance and stability S/I. LTG 2 Current Status:: 10/10: patient ambulates in clinic with lateral trunk lean and decreased foot clearance   In progress   Long Term Goal 3: DGI >/=  to reduce pt's risk for falls. LTG 3 Current Status:: DGI  with slight decrease in falls   In progress   Long Term Goal 4: 5xSTS from a chair without UEs </= 15sec to improve pt's functional strength. LTG 4

## 2024-11-07 ENCOUNTER — APPOINTMENT (OUTPATIENT)
Dept: CT IMAGING | Age: 74
End: 2024-11-07
Payer: MEDICARE

## 2024-11-07 ENCOUNTER — HOSPITAL ENCOUNTER (OUTPATIENT)
Dept: PHYSICAL THERAPY | Age: 74
Setting detail: THERAPIES SERIES
Discharge: HOME | End: 2024-11-07
Payer: MEDICARE

## 2024-11-07 ENCOUNTER — HOSPITAL ENCOUNTER (EMERGENCY)
Age: 74
Discharge: HOME | End: 2024-11-07
Payer: MEDICARE

## 2024-11-07 VITALS
HEIGHT: 66 IN | TEMPERATURE: 97.8 F | WEIGHT: 165 LBS | SYSTOLIC BLOOD PRESSURE: 138 MMHG | RESPIRATION RATE: 18 BRPM | BODY MASS INDEX: 26.52 KG/M2 | DIASTOLIC BLOOD PRESSURE: 72 MMHG | OXYGEN SATURATION: 93 % | HEART RATE: 78 BPM

## 2024-11-07 DIAGNOSIS — M53.9 MULTILEVEL DEGENERATIVE DISC DISEASE: ICD-10-CM

## 2024-11-07 DIAGNOSIS — S09.90XA INJURY OF HEAD, INITIAL ENCOUNTER: Primary | ICD-10-CM
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
## 2024-11-07 DIAGNOSIS — S00.03XA HEMATOMA OF SCALP, INITIAL ENCOUNTER: ICD-10-CM

## 2024-11-07 LAB
EKG ATRIAL RATE: 67 BPM
EKG P AXIS: 29 DEGREES
EKG P-R INTERVAL: 158 MS
EKG Q-T INTERVAL: 400 MS
EKG QRS DURATION: 80 MS
EKG QTC CALCULATION (BAZETT): 422 MS
EKG R AXIS: -25 DEGREES
EKG T AXIS: 41 DEGREES
EKG VENTRICULAR RATE: 67 BPM

## 2024-11-07 PROCEDURE — 93005 ELECTROCARDIOGRAM TRACING: CPT

## 2024-11-07 PROCEDURE — 72128 CT CHEST SPINE W/O DYE: CPT

## 2024-11-07 PROCEDURE — 99284 EMERGENCY DEPT VISIT MOD MDM: CPT

## 2024-11-07 PROCEDURE — 70450 CT HEAD/BRAIN W/O DYE: CPT

## 2024-11-07 PROCEDURE — 72131 CT LUMBAR SPINE W/O DYE: CPT

## 2024-11-07 PROCEDURE — 72125 CT NECK SPINE W/O DYE: CPT

## 2024-11-07 PROCEDURE — 93010 ELECTROCARDIOGRAM REPORT: CPT | Performed by: INTERNAL MEDICINE

## 2024-11-07 ASSESSMENT — LIFESTYLE VARIABLES
HOW MANY STANDARD DRINKS CONTAINING ALCOHOL DO YOU HAVE ON A TYPICAL DAY: PATIENT DOES NOT DRINK
HOW OFTEN DO YOU HAVE A DRINK CONTAINING ALCOHOL: NEVER

## 2024-11-07 ASSESSMENT — PAIN - FUNCTIONAL ASSESSMENT: PAIN_FUNCTIONAL_ASSESSMENT: 0-10

## 2024-11-07 ASSESSMENT — PAIN DESCRIPTION - LOCATION: LOCATION: HEAD

## 2024-11-07 ASSESSMENT — PAIN SCALES - GENERAL: PAINLEVEL_OUTOF10: 8

## 2024-11-07 ASSESSMENT — PAIN DESCRIPTION - ORIENTATION: ORIENTATION: POSTERIOR

## 2024-11-07 NOTE — ED PROVIDER NOTES
12 hours off., Disp-30 patch, R-0Print      magnesium oxide (MAG-OX) 400 MG tablet Take 400 mg by mouth dailyHistorical Med      niacin 250 MG extended release capsule Take 250 mg by mouth nightlyHistorical Med      lisinopril (PRINIVIL;ZESTRIL) 20 MG tablet Take 20 mg by mouth daily      levothyroxine (SYNTHROID) 25 MCG tablet Take 25 mcg by mouth daily      buPROPion (WELLBUTRIN) 100 MG tablet Take 100 mg by mouth daily      aspirin 81 MG tablet Take 81 mg by mouth 2 times daily      Multiple Vitamins-Minerals (THERAPEUTIC MULTIVITAMIN-MINERALS) tablet Take 1 tablet by mouth      cephALEXin (KEFLEX) 500 MG capsule Take 1 capsule by mouth 4 times daily, Disp-40 capsule, R-0             ALLERGIES     Indomethacin and Zomig [zolmitriptan]    FAMILY HISTORY     History reviewed. No pertinent family history.       SOCIAL HISTORY       Social History     Socioeconomic History    Marital status:      Spouse name: None    Number of children: None    Years of education: None    Highest education level: None   Tobacco Use    Smoking status: Never    Smokeless tobacco: Never   Substance and Sexual Activity    Alcohol use: No    Drug use: No       SCREENINGS    Price Coma Scale  Eye Opening: Spontaneous  Best Verbal Response: Oriented  Best Motor Response: Obeys commands  Price Coma Scale Score: 15          PHYSICAL EXAM    (up to 7 for level 4, 8 or more for level 5)     ED Triage Vitals [11/07/24 1256]   BP Systolic BP Percentile Diastolic BP Percentile Temp Temp Source Pulse Respirations SpO2   (!) 178/83 -- -- 97.8 °F (36.6 °C) Oral 78 18 98 %      Height Weight - Scale         1.676 m (5' 6\") 74.8 kg (165 lb)             Physical Exam  Vitals and nursing note reviewed.   HENT:      Head: Normocephalic and atraumatic.        Comments: Small hematoma located over her posterior scalp     Right Ear: Tympanic membrane, ear canal and external ear normal.      Left Ear: Tympanic membrane, ear canal and external ear  evidence for acute fracture.  The         CT THORACIC SPINE WO CONTRAST   Final Result   1. No acute fracture or traumatic malalignment of the thoracic spine.   2. Moderate multilevel degenerative changes.             LABS:  Labs Reviewed - No data to display    All other labs were within normal range or not returned as of this dictation.    EMERGENCY DEPARTMENT COURSE and DIFFERENTIAL DIAGNOSIS/MDM:     Vitals:    Vitals:    11/07/24 1500 11/07/24 1530 11/07/24 1600 11/07/24 1630   BP: (!) 145/83 (!) 140/81 138/77 138/72   Pulse:       Resp:       Temp:       TempSrc:       SpO2:       Weight:       Height:           MDM      74-year-old female with no significant past medical history presents for evaluation after sustaining a fall.  Patient had a mechanical fall, and fell backwards hitting her head.  Patient denies any loss of consciousness and has not any blood thinners.  Patient arrival is AOx4, afebrile and hemodynamically stable.  Physical exam including neurological exam is largely unremarkable, there is a small hematoma located over the posterior scalp, no active bleeding noted.  No evidence of any trauma elsewhere.  CT imaging of the head, cervical thoracic and lumbar spine has been obtained, which is negative for any acute osseous or intracranial process.  There is redemonstration of chronic degenerative disc disease, see radiology report for complete details.  Patient has been updated on results of our workup, patient has been advised and provided literature on postconcussive syndrome.  All questions answered, return precautions have been given, and patient will be discharged at this time  Patient asked to return to the emergency department if their symptoms worsen. Patient verbalized that they are agreeable and understandable to the plan. The results of pertinent diagnostic studies and exam findings were discussed with the patient. The patient's provisional diagnosis was discussed. The risks of

## 2024-11-07 NOTE — PROGRESS NOTES
Therapy                            Cancellation/No-show Note    Date: 2024  Patient: Dorothy Mireles (74 y.o. female)  : 1950  MRN:  [id number]  Referring Physician: Evan Culp APRN - CNP    Medical Diagnosis: Other abnormalities of gait and mobility [R26.89]  Repeated falls [R29.6]      Visit Information:  Insurance: Payor: [facility] MEDICARE / Plan: [facility] MEDICARE COMPLETE / Product Type: *No Product type* /   Visits to Date: 6   No Show/Cancelled Appts: 0 / 1      For today's appointment patient:  [x]  Cancelled  []  Rescheduled appointment  []  No-show   []  Called pt to remind of next appointment     Reason given by patient:  []  Patient ill  []  Conflicting appointment  []  No transportation    []  Conflict with work  []  No reason given  [x]  Other:  Fell, getting a CT scan.    [x] Pt has future appointments scheduled, no follow up needed  [] Pt requests to be on hold.    Reason:   If > 2 weeks please discuss with therapist.  [] Therapist to call pt for follow up  []  to call pt to reschedule   Comments:       Signature: Electronically signed by Zo Segovia PTA on 24 at 2:49 PM EST

## 2024-11-07 NOTE — ED TRIAGE NOTES
Pt was walking into her house and lost her balance on the first step and fell backwards on cement  Hematoma on the back of the head   Pt denies n/v  Pt states she feels foggy   Pt states her pian is a 8/10

## 2024-11-11 ENCOUNTER — HOSPITAL ENCOUNTER (OUTPATIENT)
Dept: PHYSICAL THERAPY | Age: 74
Setting detail: THERAPIES SERIES
Discharge: HOME | End: 2024-11-11
Payer: MEDICARE

## 2024-11-11 NOTE — PROGRESS NOTES
Therapy                            Cancellation/No-show Note    Date: 2024  Patient: Dorothy Mireles (74 y.o. female)  : 1950  MRN:  [id number]  Referring Physician: Evan Culp APRN - CNP    Medical Diagnosis: Other abnormalities of gait and mobility [R26.89]  Repeated falls [R29.6]      Visit Information:  Insurance: Payor: [facility] MEDICARE / Plan: [facility] MEDICARE COMPLETE / Product Type: *No Product type* /   Visits to Date: 6   No Show/Cancelled Appts: 0 / 2      For today's appointment patient:  [x]  Cancelled  []  Rescheduled appointment  []  No-show   []  Called pt to remind of next appointment     Reason given by patient:  []  Patient ill  []  Conflicting appointment  []  No transportation    []  Conflict with work  []  No reason given  [x]  Other:  Fell last week, needs to take today off    [x] Pt has future appointments scheduled, no follow up needed  [] Pt requests to be on hold.    Reason:   If > 2 weeks please discuss with therapist.  [] Therapist to call pt for follow up  []  to call pt to reschedule   Comments:       Signature: Electronically signed by Zo Segovia PTA on 24 at 11:35 AM EST

## 2024-11-14 ENCOUNTER — HOSPITAL ENCOUNTER (OUTPATIENT)
Dept: PHYSICAL THERAPY | Age: 74
Setting detail: THERAPIES SERIES
Discharge: HOME | End: 2024-11-14
Payer: MEDICARE

## 2024-11-14 PROCEDURE — 97110 THERAPEUTIC EXERCISES: CPT

## 2024-11-14 NOTE — PROGRESS NOTES
coordination  Assessment: Modified tx this date with increased focus on upright and balance trainign with good results. Rakesh reported feeling very good about today's tx and wants to utilize a fitter board for home use. Mod education provided for righting mechanisms, over use, and compensatory patterns with verbalized understanding. Progress as able.  Treatment Diagnosis: Frequent falls, Proximal muscle weakness  Therapy Prognosis: Good       Patient Education: [x] NA       Post-Pain Assessment:       Pain Rating (0-10 pain scale):   not numerically rated/10 I feel much better about everything and even much better from when I came in.  Location and pain description same as pre-treatment unless indicated.   Action: [] NA   [x] Perform HEP  [x] Meds as prescribed  [x] Modalities as prescribed   [] Call Physician     GOALS   Patient Goal(s):      Short Term Goals Completed by 3 weeks Goal Status   STG 1 Independent with HEP. In progress   STG 2 Pt will be able to complete a STS transfer from a chair without UEs with a normal DELFINA S/I. Partially met     Long Term Goals Completed by 6 weeks Goal Status   LTG 1 Improve bhavana LE strength by >/= 1 MMT grade to improve transfers and mobility. In progress   LTG 2 Pt will ambulate >/= 250' on even and uneven surfaces with improved foot clearance and stability S/I. In progress   LTG 3 DGI >/= 21/24 to reduce pt's risk for falls. In progress   LTG 4 5xSTS from a chair without UEs </= 15sec to improve pt's functional strength. In progress, Partially met   LTG 5 Pt will complete floor transfers through 1/2 kneel with increased ease and decreased time S/I. In progress            Plan:  Frequency/Duration:  Plan  Plan Frequency: 2  Plan weeks: 6  Current Treatment Recommendations: Strengthening, ROM, Balance training, Functional mobility training, Transfer training, Stair training, Gait training, Neuromuscular re-education, Manual, Home exercise program, Safety education & training,

## 2024-11-18 ENCOUNTER — APPOINTMENT (OUTPATIENT)
Dept: PHYSICAL THERAPY | Age: 74
End: 2024-11-18
Payer: MEDICARE

## 2024-11-20 ENCOUNTER — HOSPITAL ENCOUNTER (OUTPATIENT)
Dept: PHYSICAL THERAPY | Age: 74
Setting detail: THERAPIES SERIES
Discharge: HOME | End: 2024-11-20
Payer: MEDICARE

## 2024-11-20 PROCEDURE — 97110 THERAPEUTIC EXERCISES: CPT

## 2024-11-20 PROCEDURE — 97112 NEUROMUSCULAR REEDUCATION: CPT

## 2024-11-20 ASSESSMENT — PAIN SCALES - GENERAL: PAINLEVEL_OUTOF10: 4

## 2024-11-20 NOTE — PROGRESS NOTES
[facility]  PHYSICAL THERAPY PLAN OF CARE                                    [facility] [place] [address]     Ph: [phone]  Fax: [phone]    [] Certification  [] Recertification []  Plan of Care  [x] Progress Note [] Discharge      Referring Provider: Evan Culp APRN - CNP    From: Milagros Mcqueen PT    Patient: Dorothy Mireles (74 y.o. female) : 1950 Date: 2024   Medical Diagnosis: Other abnormalities of gait and mobility [R26.89]  Repeated falls [R29.6]    Treatment Diagnosis: Frequent falls, Proximal muscle weakness    Plan of Care/Certification Expiration Date: 24   Progress Report Period from: 2024 to 2024    Visits to Date: 8 No Show: 0 Cancelled Appts: 2    OBJECTIVE:   Short Term Goals - Time Frame for Short Term Goals: 3 weeks    Goals Current/Discharge status  Status   Short Term Goal 1: Independent with HEP.  Independent; ongoing   In progress   Short Term Goal 2: Pt will be able to complete a STS transfer from a chair without UEs with a normal DELFINA S/I.   Requires UE use to complete with increased time to complete   Partially met     Long Term Goals - Time Frame for Long Term Goals : 6 weeks  Goals Current/ Discharge status Status   Long Term Goal 1: Improve bhavana LE strength by >/= 1 MMT grade to improve transfers and mobility.  Strength RLE  R Hip Flexion: 4/5  R Hip Extension: 3+/5  R Hip ABduction: 4/5  R Ankle Dorsiflexion: 4+/5  Strength LLE  L Hip Flexion: 4/5  L Hip Extension: 3+/5  L Hip ABduction: 4-/5  L Ankle Dorsiflexion: 4+/5       In progress   Long Term Goal 2: Pt will ambulate >/= 250' on even and uneven surfaces with improved foot clearance and stability S/I.    250ft with occ decreased foot clearance, improving stability. In progress   Long Term Goal 3: DGI >/= / to reduce pt's risk for falls.    DGI:  Met   Long Term Goal 4: 5xSTS from a chair without UEs </= 15sec to improve pt's functional strength.    5xSTS:

## 2024-11-20 NOTE — PROGRESS NOTES
[facility]  Outpatient Physical Therapy    Treatment Note        Date: 2024  Patient: Dorothy Mireles  : 1950   Confirmed: Yes  MRN: [id number]  Referring Provider: Evan Culp APRN - CNP    Medical Diagnosis: Other abnormalities of gait and mobility [R26.89]  Repeated falls [R29.6]       Treatment Diagnosis: Frequent falls, Proximal muscle weakness    Visit Information:  Insurance: Payor: [facility] MEDICARE / Plan: [facility] MEDICARE COMPLETE / Product Type: *No Product type* /   PT Visit Information  PT Insurance Information: [employer] Medicare  Total # of Visits Approved: 13 (12 then 8 used )  Total # of Visits to Date: 8  Plan of Care/Certification Expiration Date: 24  No Show: 0  Canceled Appointment: 2  Progress Note Counter: 3/8 (8 visits 10/23/24-24)    Subjective Information:  Subjective: Patient reports PT has been helping however feels she had a set back after recent fall. Reports ongoing Tailbone pain limiting movement such as bending.  HEP Compliance:  [x] Good [] Fair [] Poor [] Reports not doing due to:               Pain Screening  Patient Currently in Pain: Yes  Pain Level: 4  Pain Location:  (Tailbone)  Pain Orientation:  (sore)    Treatment:  Exercises:  Exercises  Exercise 5: Pball Bridges 3'' x10 (Decreased ROM)  Exercise 6: Floor transfers SBA: UE use on knees  Exercise 7: Seated lateral hip flexion over 6'' robyn x10 Pilo  Exercise 9: Quadruped: UE lifts, LE, lifts, bird dogs with difficulty  Exercise 13: Rockerboard 4way 0-1 UE support  Exercise 17: Obj measures for PN: MMT, STS, MMT  Exercise 19: Floor transfers through 1/2 kneel with UEs pushing on knees  Exercise 20: HEP: cont current + Quadruped         *Indicates exercise, modality, or manual techniques to be initiated when appropriate    Objective Measures:        Strength: [] NT  [x] MMT completed:  Strength RLE  R Hip Flexion: 4/5  R Hip Extension: 3+/5  R Hip ABduction: 4/5  R Ankle Dorsiflexion:

## 2024-11-22 ENCOUNTER — APPOINTMENT (OUTPATIENT)
Dept: PHYSICAL THERAPY | Age: 74
End: 2024-11-22
Payer: MEDICARE

## 2024-11-26 ENCOUNTER — HOSPITAL ENCOUNTER (OUTPATIENT)
Dept: PHYSICAL THERAPY | Age: 74
Setting detail: THERAPIES SERIES
Discharge: HOME | End: 2024-11-26
Payer: MEDICARE

## 2024-11-26 PROCEDURE — 97112 NEUROMUSCULAR REEDUCATION: CPT

## 2024-11-26 PROCEDURE — 97110 THERAPEUTIC EXERCISES: CPT

## 2024-11-26 ASSESSMENT — PAIN SCALES - GENERAL: PAINLEVEL_OUTOF10: 3

## 2024-11-26 ASSESSMENT — PAIN DESCRIPTION - LOCATION: LOCATION: KNEE

## 2024-11-26 NOTE — PROGRESS NOTES
[facility]  Outpatient Physical Therapy   Treatment Note        Date: 2024  Patient: Dorothy Mireles  : 1950   Confirmed: Yes  MRN: [id number]  Referring Provider: vEan Culp APRN - CNP      Medical Diagnosis: Other abnormalities of gait and mobility [R26.89]  Repeated falls [R29.6]      Treatment Diagnosis: Frequent falls, Proximal muscle weakness    Visit Information:  Insurance: Payor: [facility] MEDICARE / Plan: [facility] MEDICARE COMPLETE / Product Type: *No Product type* /   PT Visit Information  PT Insurance Information: [employer] Medicare  Total # of Visits Approved: 4 (4v until 24)  Total # of Visits to Date: 9  Plan of Care/Certification Expiration Date: 24  No Show: 0  Progress Note Due Date: 24  Canceled Appointment: 2  Progress Note Counter:  (4 visits -24)    Subjective Information:  Subjective: Feeling sore all over today, 3/10. BL knee stiffness/pain.   tailbone limiting bending.  HEP Compliance:  [x] Good [] Fair [] Poor [] Reports not doing due to:    Pain Screening  Patient Currently in Pain: Yes  Pain Level: 3  Pain Location: Knee    Treatment:  Exercises:  Exercises  Exercise 4: FGA= 23  Exercise 7: Gait drills: 5 hurdles x6-8 laps, emphasis on march  Exercise 8: Seated on ball, marches, wood chops 4lb ball, front raises 4lb ball, trunk rotation 4lb ball *  Exercise 10: STS from ball x15*  Exercise 13: Rockerboard 4way x25 0-1 UE support  Exercise 17: Warm up: Scitfit L4.5 x6min  Exercise 20: HEP: Marches/STS from ball       *Indicates exercise, modality, or manual techniques to be initiated when appropriate    Objective Measures:     STG 1 Current Status:: I with current program  STG 2 Current Status:: : STS x5-8  from ball no UE support     LTG 3 Current Status:: : FGA baseline   LTG 4 Current Status:: : STS x5-8 from ball no UE support to challenge functional strength from a compliant surface     Assessment:   Body Structures,

## 2024-12-03 ENCOUNTER — OFFICE VISIT (OUTPATIENT)
Dept: ORTHOPEDIC SURGERY | Facility: CLINIC | Age: 74
End: 2024-12-03
Payer: MEDICARE

## 2024-12-03 ENCOUNTER — HOSPITAL ENCOUNTER (OUTPATIENT)
Dept: RADIOLOGY | Facility: HOSPITAL | Age: 74
Discharge: HOME | End: 2024-12-03
Payer: MEDICARE

## 2024-12-03 DIAGNOSIS — G57.01 PIRIFORMIS SYNDROME OF RIGHT SIDE: Primary | ICD-10-CM

## 2024-12-03 DIAGNOSIS — S72.001A CLOSED RIGHT HIP FRACTURE, INITIAL ENCOUNTER: ICD-10-CM

## 2024-12-03 DIAGNOSIS — M25.551 RIGHT HIP PAIN: ICD-10-CM

## 2024-12-03 DIAGNOSIS — M54.16 LUMBAR RADICULOPATHY: ICD-10-CM

## 2024-12-03 DIAGNOSIS — Z87.39 HISTORY OF DEGENERATIVE DISC DISEASE: ICD-10-CM

## 2024-12-03 DIAGNOSIS — S70.00XA CONTUSION OF HIP, INITIAL ENCOUNTER: ICD-10-CM

## 2024-12-03 PROCEDURE — 73502 X-RAY EXAM HIP UNI 2-3 VIEWS: CPT | Mod: RT

## 2024-12-03 PROCEDURE — 99214 OFFICE O/P EST MOD 30 MIN: CPT | Performed by: STUDENT IN AN ORGANIZED HEALTH CARE EDUCATION/TRAINING PROGRAM

## 2024-12-03 PROCEDURE — E0143 WALKER FOLDING WHEELED W/O S: HCPCS | Performed by: STUDENT IN AN ORGANIZED HEALTH CARE EDUCATION/TRAINING PROGRAM

## 2024-12-03 PROCEDURE — 73502 X-RAY EXAM HIP UNI 2-3 VIEWS: CPT | Mod: RIGHT SIDE | Performed by: RADIOLOGY

## 2024-12-03 NOTE — PROGRESS NOTES
Acute Injury Established Patient Visit    HPI: Sol is a 74 y.o.female who presents today with new complaints of right hip pain.  She fell a couple weeks ago.  She states that she fell backwards, hit her tailbone, and her head in the posterior aspect.  She went to the emergency department at [facility] on 11/7/2024, and had a CT of her head, neck, thoracic, and lumbar spine.  All were negative for acute fracture.  She is having pain laterally and posteriorly.  She has been walking on it but is getting worse.  She denies any numbness tingling.  She denies any significant groin pain, but does occasionally have some of this as well.  She denies any significant swelling or bruising.  She has been using a cane and ibuprofen.  Unfortunately, she fell again a few days before Thanksgiving, and this made it even worse.    Plan: For this concern for an occult fracture of the right hip, we will obtain a stat CT of the right hip to rule this out, obtain an MRI of the lumbar spine which shows severe DDD and bulging disks.  She will follow-up with the spine team with the results of the MRI.  We will provide her a walker.  She should maintain minimal weightbearing as possible.  Follow-up with CT results.    Assessment:   Problem List Items Addressed This Visit    None  Visit Diagnoses       Piriformis syndrome of right side    -  Primary    Relevant Orders    Referral to Physical Therapy    Wheeled Folding Walker    CT hip right wo IV contrast    MR lumbar spine wo IV contrast    Right hip pain        Relevant Orders    XR hip right with pelvis when performed 2 or 3 views    Referral to Physical Therapy    Wheeled Folding Walker    CT hip right wo IV contrast    MR lumbar spine wo IV contrast    Contusion of hip, initial encounter        Relevant Orders    Referral to Physical Therapy    Wheeled Folding Walker    CT hip right wo IV contrast    MR lumbar spine wo IV contrast    History of degenerative disc disease        Relevant  Orders    Referral to Physical Therapy    Wheeled Folding Walker    CT hip right wo IV contrast    MR lumbar spine wo IV contrast    Lumbar radiculopathy        Relevant Orders    Referral to Physical Therapy    Wheeled Folding Walker    CT hip right wo IV contrast    MR lumbar spine wo IV contrast    Closed right hip fracture, initial encounter                Diagnostics: Reviewed all relevant imaging including x-ray, MRI, CT, and US.      Procedure:  Procedures    Physical Exam:  GENERAL:  No obvious acute distress.  NEURO:  Distally neurovascularly intact.  Sensation intact to light touch.  Extremity: Right hip exam:  Skin healthy and intact  No gross swelling or ecchymosis  No erythema or warmth  TENDER over the greater trochanter  No pain over the gluteus medius tendon  No pain over the piriformis tendon  No pain over the proximal IT band  Negative JANIE  Negative FADIR  Painful with internal and external rotation  Full strength in hip flexion, adduction, and abduction    Orders Placed This Encounter    Wheeled Folding Walker    XR hip right with pelvis when performed 2 or 3 views    CT hip right wo IV contrast    MR lumbar spine wo IV contrast    Referral to Physical Therapy      At the conclusion of the visit there were no further questions by the patient/family regarding their plan of care.  Patient was instructed to call or return with any issues, questions, or concerns regarding their injury and/or treatment plan described above.     12/03/24 at 4:45 PM - Evangelista Fabian,     Office: [phone]    This note was prepared using voice recognition software.  The details of this note are correct and have been reviewed, and corrected to the best of my ability.  Some grammatical errors may persist related to the Dragon software.

## 2024-12-04 ENCOUNTER — ANCILLARY PROCEDURE (OUTPATIENT)
Facility: HOSPITAL | Age: 74
End: 2024-12-04
Payer: MEDICARE

## 2024-12-04 DIAGNOSIS — G57.01 PIRIFORMIS SYNDROME OF RIGHT SIDE: ICD-10-CM

## 2024-12-04 DIAGNOSIS — Z87.39 HISTORY OF DEGENERATIVE DISC DISEASE: ICD-10-CM

## 2024-12-04 DIAGNOSIS — M54.16 LUMBAR RADICULOPATHY: ICD-10-CM

## 2024-12-04 DIAGNOSIS — M25.551 RIGHT HIP PAIN: ICD-10-CM

## 2024-12-04 DIAGNOSIS — S70.00XA CONTUSION OF HIP, INITIAL ENCOUNTER: ICD-10-CM

## 2024-12-04 PROCEDURE — 76376 3D RENDER W/INTRP POSTPROCES: CPT | Mod: RIGHT SIDE | Performed by: STUDENT IN AN ORGANIZED HEALTH CARE EDUCATION/TRAINING PROGRAM

## 2024-12-04 PROCEDURE — 73700 CT LOWER EXTREMITY W/O DYE: CPT | Mod: RT

## 2024-12-04 PROCEDURE — 73700 CT LOWER EXTREMITY W/O DYE: CPT | Mod: RIGHT SIDE | Performed by: STUDENT IN AN ORGANIZED HEALTH CARE EDUCATION/TRAINING PROGRAM

## 2024-12-05 ENCOUNTER — OFFICE VISIT (OUTPATIENT)
Dept: ORTHOPEDIC SURGERY | Facility: CLINIC | Age: 74
End: 2024-12-05
Payer: MEDICARE

## 2024-12-05 ENCOUNTER — TELEPHONE (OUTPATIENT)
Dept: ORTHOPEDIC SURGERY | Facility: CLINIC | Age: 74
End: 2024-12-05

## 2024-12-05 ENCOUNTER — HOSPITAL ENCOUNTER (OUTPATIENT)
Dept: PHYSICAL THERAPY | Age: 74
Setting detail: THERAPIES SERIES
Discharge: HOME | End: 2024-12-05

## 2024-12-05 DIAGNOSIS — S32.10XA CLOSED FRACTURE OF SACRUM, UNSPECIFIED FRACTURE MORPHOLOGY, INITIAL ENCOUNTER (MULTI): Primary | ICD-10-CM

## 2024-12-05 DIAGNOSIS — G57.01 PIRIFORMIS SYNDROME OF RIGHT SIDE: ICD-10-CM

## 2024-12-05 DIAGNOSIS — S70.00XA CONTUSION OF HIP, INITIAL ENCOUNTER: ICD-10-CM

## 2024-12-05 DIAGNOSIS — M54.16 LUMBAR RADICULOPATHY: ICD-10-CM

## 2024-12-05 RX ORDER — TRAMADOL HYDROCHLORIDE 50 MG/1
50 TABLET ORAL EVERY 12 HOURS PRN
Qty: 14 TABLET | Refills: 0 | Status: SHIPPED | OUTPATIENT
Start: 2024-12-05 | End: 2024-12-12

## 2024-12-05 NOTE — PROGRESS NOTES
Therapy                            Cancellation/No-show Note    Date: 2024  Patient: Dorothy Mireles (74 y.o. female)  : 1950  MRN:  [id number]  Referring Physician: Evan Culp APRN - CNP    Medical Diagnosis: Other abnormalities of gait and mobility [R26.89]  Repeated falls [R29.6]      Visit Information:  Insurance: Payor: [facility] MEDICARE / Plan: [facility] MEDICARE COMPLETE / Product Type: *No Product type* /   Visits to Date: 9   No Show/Cancelled Appts: 0 / 3      For today's appointment patient:  [x]  Cancelled  []  Rescheduled appointment  []  No-show   []  Called pt to remind of next appointment     Reason given by patient:  []  Patient ill  []  Conflicting appointment  []  No transportation    []  Conflict with work  []  No reason given  [x]  Other:  Having issues with walking. Pt came into clinic to discuss recent severe pain with walking. Pt recently seen by ortho urgent care and given walker.  Has a f/u today or tomorrow with orthopedic doctor to review CT scan results.  Will call and inform therapist of result of appointment to determine further treatment     [x] Pt has future appointments scheduled, no follow up needed  [] Pt requests to be on hold.    Reason:   If > 2 weeks please discuss with therapist.  [] Therapist to call pt for follow up  []  to call pt to reschedule   Comments:       Signature: Electronically signed by Milagros Mcqueen PT on 24 at 11:19 AM EST

## 2024-12-05 NOTE — TELEPHONE ENCOUNTER
Pt has follow up with RW tomorrow. Is asking if something can be sent in for pain to help her sleep.

## 2024-12-05 NOTE — PROGRESS NOTES
Acute Injury Established Patient Visit    HPI: Sol is a 74 y.o.female who presents today for follow-up of her CT of the right hip.  CT results show a nondisplaced sacral ala fracture as well as a nondisplaced anterior body of S3 fracture.  Shows no hip fracture.  She still has considerable pain.  She has been using her walker.  She denies any interval development of bowel or bladder changes, saddle anesthesia, weakness, or worsening numbness and tingling.  She does still have some numbness and tingling going down the right leg.    On 12/3/2024, she presented with new complaints of right hip pain.  She fell a couple weeks ago.  She states that she fell backwards, hit her tailbone, and her head in the posterior aspect.  She went to the emergency department at [facility] on 11/7/2024, and had a CT of her head, neck, thoracic, and lumbar spine.  All were negative for acute fracture.  She is having pain laterally and posteriorly.  She has been walking on it but is getting worse.  She denies any numbness tingling.  She denies any significant groin pain, but does occasionally have some of this as well.  She denies any significant swelling or bruising.  She has been using a cane and ibuprofen.  Unfortunately, she fell again a few days before Thanksgiving, and this made it even worse.    Plan: Today, on 12/5/2024, after review of the CT with my partner, Dr. Radha Sky, we decided that this can be managed nonoperatively with weightbearing as tolerated.  She should follow-up with one of our spine team PAs to discuss possible injections and further management.  We also ordered her lumbar spine MRI for further evaluation of her lumbar radicular symptoms.  We will start her on tramadol for pain control.  She should continue with other conservative treatment measures.  She should use the walker for comfort.  Follow-up next week with the spine team.    On 12/3/2024, for this concern for an occult fracture of the right hip, we will  obtain a stat CT of the right hip to rule this out, obtain an MRI of the lumbar spine which shows severe DDD and bulging disks.  She will follow-up with the spine team with the results of the MRI.  We will provide her a walker.  She should maintain minimal weightbearing as possible.  Follow-up with CT results.    Assessment:   Problem List Items Addressed This Visit    None  Visit Diagnoses       Closed fracture of sacrum, unspecified fracture morphology, initial encounter (Multi)    -  Primary    Relevant Medications    traMADol (Ultram) 50 mg tablet    Piriformis syndrome of right side        Lumbar radiculopathy        Contusion of hip, initial encounter                Diagnostics: Reviewed all relevant imaging including x-ray, MRI, CT, and US.      Procedure:  Procedures    Physical Exam:  GENERAL:  No obvious acute distress.  NEURO:  Distally neurovascularly intact.  Sensation intact to light touch.  Extremity: Right hip exam:  Skin healthy and intact  No gross swelling or ecchymosis  No erythema or warmth  TENDER over the greater trochanter  No pain over the gluteus medius tendon  No pain over the piriformis tendon  No pain over the proximal IT band  Negative JANIE  Negative FADIR  Painful with internal and external rotation  Full strength in hip flexion, adduction, and abduction    Orders Placed This Encounter    traMADol (Ultram) 50 mg tablet      At the conclusion of the visit there were no further questions by the patient/family regarding their plan of care.  Patient was instructed to call or return with any issues, questions, or concerns regarding their injury and/or treatment plan described above.     12/05/24 at 3:56 PM - Evangelista Fabian DO    Office: [phone]    This note was prepared using voice recognition software.  The details of this note are correct and have been reviewed, and corrected to the best of my ability.  Some grammatical errors may persist related to the Dragon software.

## 2024-12-06 ENCOUNTER — APPOINTMENT (OUTPATIENT)
Dept: ORTHOPEDIC SURGERY | Facility: CLINIC | Age: 74
End: 2024-12-06
Payer: MEDICARE

## 2024-12-12 ENCOUNTER — TELEPHONE (OUTPATIENT)
Dept: ORTHOPEDIC SURGERY | Facility: CLINIC | Age: 74
End: 2024-12-12
Payer: MEDICARE

## 2024-12-12 DIAGNOSIS — S32.10XA CLOSED FRACTURE OF SACRUM, UNSPECIFIED FRACTURE MORPHOLOGY, INITIAL ENCOUNTER (MULTI): Primary | ICD-10-CM

## 2024-12-12 RX ORDER — TRAMADOL HYDROCHLORIDE 50 MG/1
50 TABLET ORAL EVERY 12 HOURS PRN
Qty: 10 TABLET | Refills: 0 | Status: SHIPPED | OUTPATIENT
Start: 2024-12-12 | End: 2024-12-17

## 2024-12-16 ENCOUNTER — HOSPITAL ENCOUNTER (OUTPATIENT)
Dept: RADIOLOGY | Facility: HOSPITAL | Age: 74
Discharge: HOME | End: 2024-12-16
Payer: MEDICARE

## 2024-12-16 DIAGNOSIS — M54.16 LUMBAR RADICULOPATHY: ICD-10-CM

## 2024-12-16 DIAGNOSIS — S70.00XA CONTUSION OF HIP, INITIAL ENCOUNTER: ICD-10-CM

## 2024-12-16 DIAGNOSIS — M25.551 RIGHT HIP PAIN: ICD-10-CM

## 2024-12-16 DIAGNOSIS — Z87.39 HISTORY OF DEGENERATIVE DISC DISEASE: ICD-10-CM

## 2024-12-16 DIAGNOSIS — G57.01 PIRIFORMIS SYNDROME OF RIGHT SIDE: ICD-10-CM

## 2024-12-16 PROCEDURE — 72148 MRI LUMBAR SPINE W/O DYE: CPT | Performed by: RADIOLOGY

## 2024-12-16 PROCEDURE — 72148 MRI LUMBAR SPINE W/O DYE: CPT

## 2024-12-17 ENCOUNTER — APPOINTMENT (OUTPATIENT)
Dept: ORTHOPEDIC SURGERY | Facility: CLINIC | Age: 74
End: 2024-12-17
Payer: MEDICARE

## 2024-12-19 ENCOUNTER — APPOINTMENT (OUTPATIENT)
Dept: ORTHOPEDIC SURGERY | Facility: CLINIC | Age: 74
End: 2024-12-19
Payer: MEDICARE

## 2024-12-19 DIAGNOSIS — S32.10XA CLOSED FRACTURE OF SACRUM, UNSPECIFIED FRACTURE MORPHOLOGY, INITIAL ENCOUNTER (MULTI): Primary | ICD-10-CM

## 2024-12-19 PROCEDURE — 99214 OFFICE O/P EST MOD 30 MIN: CPT | Performed by: PHYSICIAN ASSISTANT

## 2024-12-19 RX ORDER — PREDNISONE 10 MG/1
TABLET ORAL
Qty: 30 TABLET | Refills: 0 | Status: SHIPPED | OUTPATIENT
Start: 2024-12-19

## 2024-12-19 RX ORDER — CYCLOBENZAPRINE HCL 10 MG
10 TABLET ORAL 3 TIMES DAILY PRN
Qty: 30 TABLET | Refills: 0 | Status: SHIPPED | OUTPATIENT
Start: 2024-12-19 | End: 2024-12-29

## 2024-12-19 NOTE — PROGRESS NOTES
New patient to establish to the practice comes the office complaining of tailbone and sacral pain.  She had a fall back in November.  And had a CAT scan which showed a sacral and coccygeal fracture.  She had an MRI that was done was used to here to go over with today.  She had some episodes of some sciatic after the fall right leg and then off the left leg so went back and forth.  But nothing really constant no bowel or bladder complaints no saddle anesthesia there is some gait changes secondary to pain using a walker for assistance.  No spine surgeries in the past no fevers chills nausea vomiting or night sweats.    We read the emergency room note and agree with her treatment referral to us for sacral fracture.  We looked at primary doctors note check medication list see if she is on a statin type medication to cause muscular aches and pains I do not see that.    Physical exam: Well-nourished, well kept.  No lymphangitis or lymphadenopathy in the examined extremities.  patient ambulating slowly with a roller walker for range of motion cervical spine full range of motion motor strength intact.  Decreased range of motion flexion-extension rotation lumbar spine secondary to sacral pain good strength no instability moving upper extremities without any difficulty motor strength intact.  Moving lower extremities without any difficulty.  Able to get from sitting to standing without any major problems.  No redness, abrasions, or lesions on all 4 extremities, head and neck, or trunk.  Patient neurologically intact .    We looked at CT scan of the lumbar spine reviewed showing no obvious fractures dislocations no arthritic degenerative changes we looked at CT of the hip that was done showing a nondisplaced fracture through the anterior cortex of S3.    MRI: MRI was reviewed report reviewed as well as lumbar spine showing no obvious fractures dislocations or bony lytic lesions.  Arthritic degenerative changes foraminal  stenosis at different levels right and left-sided.  No major severe central stenosis or disc herniations.    Assessment: The patient with a insufficiency for sacral fracture.  I told her we just treat this conservatively.    Plan: She is going to continue sitting on a doughnut.  We try little steroid muscle relaxant since this helped her the most because she was given some in the ER she is doing some aquatic exercises to help and I told her she can continue with that as long as not increasing her pain or causing issues.  Patient will follow-up as needed.

## 2025-03-26 ENCOUNTER — TRANSCRIBE ORDERS (OUTPATIENT)
Dept: ADMINISTRATIVE | Age: 75
End: 2025-03-26

## 2025-03-26 DIAGNOSIS — Z12.31 BREAST CANCER SCREENING BY MAMMOGRAM: Primary | ICD-10-CM

## 2025-03-26 DIAGNOSIS — Z12.31 VISIT FOR SCREENING MAMMOGRAM: Primary | ICD-10-CM

## 2025-03-27 ENCOUNTER — HOSPITAL ENCOUNTER (OUTPATIENT)
Dept: WOMENS IMAGING | Age: 75
Discharge: HOME | End: 2025-03-29
Payer: MEDICARE

## 2025-03-27 DIAGNOSIS — Z12.31 BREAST CANCER SCREENING BY MAMMOGRAM: ICD-10-CM

## 2025-03-27 DIAGNOSIS — Z12.31 VISIT FOR SCREENING MAMMOGRAM: ICD-10-CM

## 2025-03-27 PROCEDURE — 77063 BREAST TOMOSYNTHESIS BI: CPT

## 2025-04-22 ENCOUNTER — HOSPITAL ENCOUNTER (OUTPATIENT)
Dept: PHYSICAL THERAPY | Age: 75
Setting detail: THERAPIES SERIES
Discharge: HOME | End: 2025-04-22
Payer: MEDICARE

## 2025-04-22 PROCEDURE — 97162 PT EVAL MOD COMPLEX 30 MIN: CPT

## 2025-04-22 NOTE — PLAN OF CARE
Physical Therapy Evaluation/Plan of Care   [facility] [place] [facility] - PT  [address]  [place] [address]  Dept: [phone]  Dept Fax: [phone]  Loc: [phone]    Physical Therapy: Initial Evaluation    General Information    Patient: Dorothy Mireles (74 y.o.     female)   Examination Date: 2025   :  1950 ;    Confirmed: Yes MRN: [id number]  CSN: [id number]   Insurance: Payor: [facility] MEDICARE / Plan: [facility] MEDICARE COMPLETE / Product Type: *No Product type* /   Insurance ID: [id number] - (Medicare Managed)    Secondary Insurance (if applicable):     Referring Physician: Evan Culp APRN - CNP       Visits to Date/Visits Approved:     No Show/Cancelled Appts: 0 / 0     Medical Diagnosis: Unspecified fracture of sacrum, subsequent encounter for fracture with routine healing [S32.10XD]  Spinal stenosis, lumbar region without neurogenic claudication [M48.061]  Other abnormalities of gait and mobility [R26.89]  Repeated falls [R29.6]        Treatment Diagnosis: LBP, decreased lumbar and B hip AROM, decreased B LE and core strength, impaired balance, impaired gait, and decreased activity tolerance      SUBJECTIVE:     Onset date: 2024     Subjective/ Mechanism of Injury: Pt reports she fell last November resulting in tailbone fx in x2 places. Pt has had multiple imaging done with extensive degenerative changes throughout lumbar region (see MRI for specifics). Pt also reports having PMH of multiple falls over the last year between 10-12. Pt reports her L toe tends to drag at times and catches on different objects. Pt uses 3 wheeled rollator for ambulation outside of home though does not use AD in home. Pt denies having any N/T. Pt conts to experience pain in LB region especially with carrying and lifting objects >5 lbs. Pt reports her speed of movement has significantly decreased with limited ability to get on and off floor or stand upright over the

## 2025-04-30 ENCOUNTER — HOSPITAL ENCOUNTER (OUTPATIENT)
Dept: PHYSICAL THERAPY | Age: 75
Setting detail: THERAPIES SERIES
Discharge: HOME | End: 2025-04-30
Payer: MEDICARE

## 2025-04-30 ENCOUNTER — APPOINTMENT (OUTPATIENT)
Dept: RADIOLOGY | Facility: CLINIC | Age: 75
End: 2025-04-30
Payer: MEDICARE

## 2025-04-30 NOTE — PROGRESS NOTES
Therapy                            Cancellation/No-show Note      Date: 2025  Patient: Dorothy Mireles (74 y.o. female)  : 1950  MRN:  [id number]  Referring Physician: Evan Culp, APRN - CNP    Medical Diagnosis: Unspecified fracture of sacrum, subsequent encounter for fracture with routine healing [S32.10XD]  Spinal stenosis, lumbar region without neurogenic claudication [M48.061]  Other abnormalities of gait and mobility [R26.89]  Repeated falls [R29.6]      Visit Information:  Visits to Date 1   No Show/Cancelled Appts:       For today's appointment patient:  []  Cancelled  []  Rescheduled appointment  [x]  No-show   []  Called pt to remind of next appointment     Reason given by patient:  []  Patient ill  []  Conflicting appointment  []  No transportation    []  Conflict with work  []  No reason given  []  Other:      [] Pt has future appointments scheduled, no follow up needed  [] Pt requests to be on hold.    Reason:   If > 2 weeks please discuss with therapist.  [] Therapist to call pt for follow up  [x] [place] to call to re-schedule      Comments:   Attempted to call, pt not responding, could here car navigation and radio in the background.     Signature: Electronically signed by Spenser Borden PTA on 25 at 2:10 PM EDT

## 2025-05-07 ENCOUNTER — HOSPITAL ENCOUNTER (OUTPATIENT)
Dept: PHYSICAL THERAPY | Age: 75
Setting detail: THERAPIES SERIES
Discharge: HOME | End: 2025-05-07
Payer: MEDICARE

## 2025-05-07 PROCEDURE — 97110 THERAPEUTIC EXERCISES: CPT

## 2025-05-07 NOTE — PROGRESS NOTES
[id number] [place] [address]  Phone: [phone]      Date: 2025  Patient: Dorothy Mireles  : 1950   Confirmed: Yes  MRN: [id number]  Referring Provider: Evan Culp, JACK - CNP    Medical Diagnosis: Unspecified fracture of sacrum, subsequent encounter for fracture with routine healing [S32.10XD]  Spinal stenosis, lumbar region without neurogenic claudication [M48.061]  Other abnormalities of gait and mobility [R26.89]  Repeated falls [R29.6]       Treatment Diagnosis: LBP, decreased lumbar and B hip AROM, decreased B LE and core strength, impaired balance, impaired gait, and decreased activity tolerance    Visit Information:  Insurance: Payor: [facility] MEDICARE / Plan: [facility] MEDICARE COMPLETE / Product Type: *No Product type* /   PT Visit Information  Total # of Visits Approved: 12  Total # of Visits to Date: 2  No Show: 1  Canceled Appointment: 0  Progress Note Counter:     Subjective Information:  Subjective: Pt denies any pain currently has conts to do indep water exercises. Denies any falls since LV.  HEP Compliance:  [x] Good [] Fair [] Poor [] Reports not doing due to:    Pain Screening  Patient Currently in Pain: Denies    Treatment:  Exercises:  Exercises  Exercise 1: SF L3.0 x5 mins  Exercise 2: TA nenita 5\"x15  Exercise 3: bridges 5\"x15  Exercise 4: SLR x15  Exercise 5: S/L hip abd x15 L x10 R  Exercise 6: clams RTB x15  Exercise 7: 4 way hip*  Exercise 8: DLS*  Exercise 9: step ups*  Exercise 10: DGI drills*  Exercise 11: Static foam*  Exercise 12: Squats*  Exercise 13: SKTC 30\"x3  Exercise 14: piriformis stretch 30\"x3 B (single knee to opposite chest)  Exercise 20: HEP: bridges, TA nenita, SLR, S/L hip abd, clams    *Indicates exercise, modality, or manual techniques to be initiated when appropriate    Objective Measures: NT this date    Assessment:      Assessment: Initiated multiple ex's to further address strength and mobility deficits. Pt demos weakness

## 2025-05-09 ENCOUNTER — HOSPITAL ENCOUNTER (OUTPATIENT)
Dept: RADIOLOGY | Facility: CLINIC | Age: 75
Discharge: HOME | End: 2025-05-09
Payer: MEDICARE

## 2025-05-09 ENCOUNTER — HOSPITAL ENCOUNTER (OUTPATIENT)
Dept: PHYSICAL THERAPY | Age: 75
Setting detail: THERAPIES SERIES
Discharge: HOME | End: 2025-05-09
Payer: MEDICARE

## 2025-05-09 DIAGNOSIS — E78.5 HYPERLIPIDEMIA, UNSPECIFIED: ICD-10-CM

## 2025-05-09 DIAGNOSIS — Z82.49 FAMILY HISTORY OF ISCHEMIC HEART DISEASE AND OTHER DISEASES OF THE CIRCULATORY SYSTEM: ICD-10-CM

## 2025-05-09 DIAGNOSIS — I73.9 PERIPHERAL VASCULAR DISEASE, UNSPECIFIED (CMS-HCC): ICD-10-CM

## 2025-05-09 DIAGNOSIS — I10 ESSENTIAL (PRIMARY) HYPERTENSION: ICD-10-CM

## 2025-05-09 PROCEDURE — 97110 THERAPEUTIC EXERCISES: CPT

## 2025-05-09 PROCEDURE — 75571 CT HRT W/O DYE W/CA TEST: CPT

## 2025-05-14 ENCOUNTER — HOSPITAL ENCOUNTER (OUTPATIENT)
Dept: PHYSICAL THERAPY | Age: 75
Setting detail: THERAPIES SERIES
Discharge: HOME | End: 2025-05-14
Payer: MEDICARE

## 2025-05-14 PROCEDURE — 97110 THERAPEUTIC EXERCISES: CPT

## 2025-05-14 ASSESSMENT — PAIN SCALES - GENERAL: PAINLEVEL_OUTOF10: 3

## 2025-05-14 ASSESSMENT — PAIN DESCRIPTION - DESCRIPTORS: DESCRIPTORS: ACHING;SORE

## 2025-05-14 ASSESSMENT — PAIN DESCRIPTION - LOCATION: LOCATION: BACK

## 2025-05-14 ASSESSMENT — PAIN DESCRIPTION - ORIENTATION: ORIENTATION: LOWER

## 2025-05-14 NOTE — PROGRESS NOTES
[address] [place] [address]  Phone: [phone]      Date: 2025  Patient: Dorothy Mireles  : 1950   Confirmed: Yes  MRN: [id number]  Referring Provider: Evan Culp APRN - CNP    Medical Diagnosis: Unspecified fracture of sacrum, subsequent encounter for fracture with routine healing [S32.10XD]  Spinal stenosis, lumbar region without neurogenic claudication [M48.061]  Other abnormalities of gait and mobility [R26.89]  Repeated falls [R29.6]       Treatment Diagnosis: LBP, decreased lumbar and B hip AROM, decreased B LE and core strength, impaired balance, impaired gait, and decreased activity tolerance    Visit Information:  Insurance: Payor: [facility] MEDICARE / Plan: [facility] MEDICARE COMPLETE / Product Type: *No Product type* /   PT Visit Information  Total # of Visits Approved: 12  Total # of Visits to Date: 4  No Show: 1  Canceled Appointment: 0  Progress Note Counter: 3/12    Subjective Information:  Subjective: Pt reports \"I wasn't able to do my exercises the last few days because I've been busy, but I did go in the pool for a few hours this morning.\"  HEP Compliance:  [] Good [] Fair [x] Poor [x] Reports not doing due to: \"I've been busy\"    Pain Screening  Patient Currently in Pain: Yes  Pain Assessment: 0-10  Pain Level: 3  Pain Location: Back  Pain Orientation: Lower  Pain Descriptors: Aching, Sore    Treatment:  Exercises:  Exercises  Exercise 1: SF seat 16 arms 6 L4.0 x5 mins  Exercise 3: bridges RTB 5\"x15  Exercise 5: S/L hip abd x15 b/l  Exercise 12: STS from slightly elevated mat table w/o UE's x10 - VC's for posture  Exercise 13: SKTC 30\"x3 / DKTC w/ red pball 5-10''x10  Exercise 15: standing hip flexor stretch at wall 30''x3 B  Exercise 16: bent knee single leg fallouts RTB 5''x10 B  Exercise 20: HEP: hip flexor stretch at wall       *Indicates exercise, modality, or manual techniques to be initiated when appropriate    Objective Measures:

## 2025-05-16 ENCOUNTER — HOSPITAL ENCOUNTER (OUTPATIENT)
Dept: PHYSICAL THERAPY | Age: 75
Setting detail: THERAPIES SERIES
Discharge: HOME | End: 2025-05-16
Payer: MEDICARE

## 2025-05-16 PROCEDURE — 97110 THERAPEUTIC EXERCISES: CPT

## 2025-05-21 ENCOUNTER — HOSPITAL ENCOUNTER (OUTPATIENT)
Dept: PHYSICAL THERAPY | Age: 75
Setting detail: THERAPIES SERIES
Discharge: HOME | End: 2025-05-21
Payer: MEDICARE

## 2025-05-21 PROCEDURE — 97110 THERAPEUTIC EXERCISES: CPT

## 2025-05-21 PROCEDURE — 97112 NEUROMUSCULAR REEDUCATION: CPT

## 2025-05-21 NOTE — PROGRESS NOTES
[address] [facility] [place] [address]  Phone: [phone]      Date: 2025  Patient: Dorothy Mireles  : 1950   Confirmed: Yes  MRN: [id number]  Referring Provider: Evan Culp APRN - CNP    Medical Diagnosis: Unspecified fracture of sacrum, subsequent encounter for fracture with routine healing [S32.10XD]  Spinal stenosis, lumbar region without neurogenic claudication [M48.061]  Other abnormalities of gait and mobility [R26.89]  Repeated falls [R29.6]       Treatment Diagnosis: LBP, decreased lumbar and B hip AROM, decreased B LE and core strength, impaired balance, impaired gait, and decreased activity tolerance    Visit Information:  Insurance: Payor: [facility] MEDICARE / Plan: [facility] MEDICARE COMPLETE / Product Type: *No Product type* /   PT Visit Information  Total # of Visits Approved: 12  Total # of Visits to Date: 6 (corrected count)  Plan of Care/Certification Expiration Date: 25  No Show: 1  Progress Note Due Date: 25  Canceled Appointment: 0  Progress Note Counter:  (through 25)- corrected count    Subjective Information:  Subjective: Pt denies any pain today. Pt reports having an episode with the R hip the other day where she felt it get \"stuck\" a few times otherwise is doing okay. Pt also reports though \"I feel like I could do more.\"  HEP Compliance:  [x] Good [] Fair [] Poor [] Reports not doing due to:    Pain Screening  Patient Currently in Pain: Denies    Treatment:  Exercises:  Exercises  Exercise 1: SF seat 16 arms 6 L4.0 x5 mins  Exercise 4: sit to stands x10 no UE's  Exercise 5: Reverse crunch with ball between knees x15  Exercise 6: tband rows/lats with TA nenita RTB x15  Exercise 7: 4 way hip RTB x 10 ea  Exercise 8: DLS march x15  Exercise 9: step ups 6\" box fwd/lat x 10  Exercise 10: Gait drills in // bars: fwd with head turns, lateral, marching w/ hold, tandem x 2 laps  Exercise 11: Static foam: FA/ FT/ EO/ EC/ mod tandem  Exercise 12:

## 2025-05-23 ENCOUNTER — HOSPITAL ENCOUNTER (OUTPATIENT)
Dept: PHYSICAL THERAPY | Age: 75
Setting detail: THERAPIES SERIES
Discharge: HOME | End: 2025-05-23
Payer: MEDICARE

## 2025-05-23 PROCEDURE — 97110 THERAPEUTIC EXERCISES: CPT

## 2025-05-23 PROCEDURE — 97112 NEUROMUSCULAR REEDUCATION: CPT

## 2025-05-23 NOTE — PROGRESS NOTES
[place] [address] [place] [address]  Phone: [phone]      Date: 2025  Patient: Dorothy Mireles  : 1950   Confirmed: Yes  MRN: [id number]  Referring Provider: Evan Culp APRN - CNP    Medical Diagnosis: Unspecified fracture of sacrum, subsequent encounter for fracture with routine healing [S32.10XD]  Spinal stenosis, lumbar region without neurogenic claudication [M48.061]  Other abnormalities of gait and mobility [R26.89]  Repeated falls [R29.6]       Treatment Diagnosis: LBP, decreased lumbar and B hip AROM, decreased B LE and core strength, impaired balance, impaired gait, and decreased activity tolerance    Visit Information:  Insurance: Payor: [facility] MEDICARE / Plan: [facility] MEDICARE COMPLETE / Product Type: *No Product type* /   PT Visit Information  Total # of Visits Approved: 12  Total # of Visits to Date: 7  Plan of Care/Certification Expiration Date: 25  No Show: 1  Progress Note Due Date: 25  Canceled Appointment: 0  Progress Note Counter:  (through 25)    Subjective Information:  Subjective: Pt denies pain today or soreness following LV.  HEP Compliance:  [x] Good [] Fair [] Poor [] Reports not doing due to:    Treatment:  Exercises:  Exercises  Exercise 1: SF seat 18 arms 6 L4.5 x5 mins  Exercise 3: Paloff press RTB (2) x15  Exercise 4: sit to stands from chiar no UE's x6 x4  Exercise 5: Reverse crunch with ball between knees x15  Exercise 6: tband rows/lats with TA nenita RTB x15  Exercise 7: 4 way hip RTB x 15 ea  Exercise 8: DLS march x15  Exercise 9: step ups 6\" box fwd/lat x 15  Exercise 12: Small hurdles fwd/ lat x 4 laps  Exercise 20: HEP: cont current    *Indicates exercise, modality, or manual techniques to be initiated when appropriate    Objective Measures:     Assessment:      Assessment: Progressed ther ex with increased reps as tolerated. Pt challenged by sit to stand from chair requiring RB after 6 reps. Pt with good toelrance to

## 2025-05-27 ENCOUNTER — HOSPITAL ENCOUNTER (OUTPATIENT)
Dept: PHYSICAL THERAPY | Age: 75
Setting detail: THERAPIES SERIES
Discharge: HOME | End: 2025-05-27
Payer: MEDICARE

## 2025-05-27 NOTE — PROGRESS NOTES
Therapy                            Cancellation/No-show Note      Date: 2025  Patient: Dorothy Mireles (75 y.o. female)  : 1950  MRN:  [id number]  Referring Physician: Evan Culp, APRN - CNP    Medical Diagnosis: Unspecified fracture of sacrum, subsequent encounter for fracture with routine healing [S32.10XD]  Spinal stenosis, lumbar region without neurogenic claudication [M48.061]  Other abnormalities of gait and mobility [R26.89]  Repeated falls [R29.6]      Visit Information:  Visits to Date 7   No Show/Cancelled Appts:       For today's appointment patient:  []  Cancelled  []  Rescheduled appointment  [x]  No-show   [x]  Called pt to remind of next appointment- left VM     Reason given by patient:  []  Patient ill  []  Conflicting appointment  []  No transportation    []  Conflict with work  []  No reason given  []  Other:      [x] Pt has future appointments scheduled, no follow up needed  [] Pt requests to be on hold.    Reason:   If > 2 weeks please discuss with therapist.  [] Therapist to call pt for follow up  []  to call to re-schedule      Comments:       Signature: Electronically signed by Joanne Brunson PT on 25 at 1:34 PM EDT

## 2025-05-29 ENCOUNTER — HOSPITAL ENCOUNTER (OUTPATIENT)
Dept: PHYSICAL THERAPY | Age: 75
Setting detail: THERAPIES SERIES
Discharge: HOME | End: 2025-05-29
Payer: MEDICARE

## 2025-05-29 PROCEDURE — 97112 NEUROMUSCULAR REEDUCATION: CPT

## 2025-05-29 PROCEDURE — 97110 THERAPEUTIC EXERCISES: CPT

## 2025-05-29 NOTE — PROGRESS NOTES
[address] [facility] [place] [address]  Phone: [phone]      Date: 2025  Patient: Dorothy Mireles  : 1950   Confirmed: Yes  MRN: [id number]  Referring Provider: Evan Culp APRN - CNP    Medical Diagnosis: Unspecified fracture of sacrum, subsequent encounter for fracture with routine healing [S32.10XD]  Spinal stenosis, lumbar region without neurogenic claudication [M48.061]  Other abnormalities of gait and mobility [R26.89]  Repeated falls [R29.6]       Treatment Diagnosis: LBP, decreased lumbar and B hip AROM, decreased B LE and core strength, impaired balance, impaired gait, and decreased activity tolerance    Visit Information:  Insurance: Payor: [facility] MEDICARE / Plan: [facility] MEDICARE COMPLETE / Product Type: *No Product type* /   PT Visit Information  Total # of Visits Approved: 12  Total # of Visits to Date: 8  Plan of Care/Certification Expiration Date: 25  No Show: 2  Progress Note Due Date: 25  Canceled Appointment: 0  Progress Note Counter:  (through 25)    Subjective Information:  Subjective: Pt arrived 8 mins late; able to accomodate. Pt reports she had a hearing test the other day and completely forgot her PT appt was at the same time. Pt arrives w/o AD today. Pt reports cont'ing going to the gym.  HEP Compliance:  [x] Good [] Fair [] Poor [] Reports not doing due to:    Pain Screening  Patient Currently in Pain: Denies    Treatment:  Exercises:  Exercises  Exercise 1: SF seat 18 arms 6 L4.5 x5 mins  Exercise 2: apollo leg press 30# x15  Exercise 3: Paloff press RTB (2) x15  Exercise 4: STS w/o UE's (attempted from chair- pt unable) from 22.5'' x10; 21'' x10; 20'' x10; from chair 19'' x1  Exercise 11: Static foam: FA/ FT/ EO/ EC/ horizontal and vertical head turns, trunk rotations  Exercise 13: step ups F/L onto foam x10 ea B  Exercise 20: HEP: cont current     *Indicates exercise, modality, or manual techniques to be initiated when

## 2025-06-04 ENCOUNTER — HOSPITAL ENCOUNTER (OUTPATIENT)
Dept: PHYSICAL THERAPY | Age: 75
Setting detail: THERAPIES SERIES
Discharge: HOME | End: 2025-06-04
Payer: MEDICARE

## 2025-06-04 PROCEDURE — 97112 NEUROMUSCULAR REEDUCATION: CPT

## 2025-06-04 PROCEDURE — 97110 THERAPEUTIC EXERCISES: CPT

## 2025-06-04 NOTE — PROGRESS NOTES
[address] [place] [address]  Phone: [phone]      Date: 2025  Patient: Dorothy Mireles  : 1950   Confirmed: Yes  MRN: [id number]  Referring Provider: Evan Culp APRN - CNP    Medical Diagnosis: Unspecified fracture of sacrum, subsequent encounter for fracture with routine healing [S32.10XD]  Spinal stenosis, lumbar region without neurogenic claudication [M48.061]  Other abnormalities of gait and mobility [R26.89]  Repeated falls [R29.6]       Treatment Diagnosis: LBP, decreased lumbar and B hip AROM, decreased B LE and core strength, impaired balance, impaired gait, and decreased activity tolerance    Visit Information:  Insurance: Payor: [facility] MEDICARE / Plan: [facility] MEDICARE COMPLETE / Product Type: *No Product type* /   PT Visit Information  Total # of Visits Approved: 12  Total # of Visits to Date: 9  Plan of Care/Certification Expiration Date: 25  No Show: 2  Progress Note Due Date: 25  Canceled Appointment: 0  Progress Note Counter:  (through 25)    Subjective Information:  Subjective: Pt reports \"I just don't feel like I have a lot of energy today.\" Pt reports she has been getting on and off of the floor and feels like floor transfers are \"getting harder and harder.\"  HEP Compliance:  [x] Good [] Fair [] Poor [] Reports not doing due to:    Pain Screening  Patient Currently in Pain: Denies    Treatment:  Exercises:  Exercises  Exercise 1: SF seat 18 arms 6 L5.0 x5 mins  Exercise 3: Paloff press GTB (2) x10  Exercise 6: tband rows/lats with TA nenita GTB x15  Exercise 14: seated trunk extension GTB x15  Exercise 16: DGI   Exercise 17: floor transfers*  Exercise 20: HEP: rows, lats, paloff press GTB       *Indicates exercise, modality, or manual techniques to be initiated when appropriate    Objective Measures:     LTG 1 Current Status:: 25 pt reports compliance w/ current    LTG 6 Current Status:: 25 5x STS w/ UE's 17.10'' TUG

## 2025-06-06 ENCOUNTER — HOSPITAL ENCOUNTER (OUTPATIENT)
Dept: PHYSICAL THERAPY | Age: 75
Setting detail: THERAPIES SERIES
Discharge: HOME | End: 2025-06-06
Payer: MEDICARE

## 2025-06-06 PROCEDURE — 97110 THERAPEUTIC EXERCISES: CPT

## 2025-06-06 NOTE — PROGRESS NOTES
[place] [address] [address] [facility] [place] [address]  Phone: [phone]      Date: 2025  Patient: Dorothy Mireles  : 1950   Confirmed: Yes  MRN: [id number]  Referring Provider: Evan Culp APRN - CNP    Medical Diagnosis: Unspecified fracture of sacrum, subsequent encounter for fracture with routine healing [S32.10XD]  Spinal stenosis, lumbar region without neurogenic claudication [M48.061]  Other abnormalities of gait and mobility [R26.89]  Repeated falls [R29.6]       Treatment Diagnosis: LBP, decreased lumbar and B hip AROM, decreased B LE and core strength, impaired balance, impaired gait, and decreased activity tolerance    Visit Information:  Insurance: Payor: [facility] MEDICARE / Plan: [facility] MEDICARE COMPLETE / Product Type: *No Product type* /   PT Visit Information  Total # of Visits Approved: 12  Total # of Visits to Date: 10  Plan of Care/Certification Expiration Date: 25  No Show: 2  Progress Note Due Date: 25  Canceled Appointment: 0  Progress Note Counter:  (through 25)    Subjective Information:  Subjective: Pt reports no pain currently. Pt reports swimming almost 1.3 miles yesterday in the pool.  HEP Compliance:  [x] Good [] Fair [] Poor [] Reports not doing due to:    Pain Screening  Patient Currently in Pain: Denies    Treatment:  Exercises:  Exercises  Exercise 1: SF seat 18 arms 6 L5.0 x6 mins  Exercise 12: cybex back ext 50# x15  Exercise 15: hip hike*  Exercise 20: HEP: cont current     *Indicates exercise, modality, or manual techniques to be initiated when appropriate    Objective Measures:     LTG 1 Current Status:: 25 pt reports compliance w/ current  LTG 2 Current Status:: 6/6/25 10/50  LTG 3 Current Status:: 25 ext 75%, B % B rot 100%  LTG 4 Current Status:: 25 R IR 30 ER 35 L IR 35 ER 20; flex NT d/t clinical oversight; pt reports minimal to no pain  LTG 5 Current Status:: 25 LLE DF/PF 4/5 knee flex/ext 4+/5 hip IR/ER 4-/5,

## 2025-06-06 NOTE — PROGRESS NOTES
[place] [address] [address] [facility] [place] [address]  Phone: [phone]    [] Certification  [] Recertification []  Plan of Care  [x] Progress Note [] Discharge      Referring Provider: Evan Culp APRN - CNP     From:  Joanne Brunson PT, DPT  Patient: Dorothy Mireles (75 y.o. female) : 1950 Date: 2025  Medical Diagnosis: Unspecified fracture of sacrum, subsequent encounter for fracture with routine healing [S32.10XD]  Spinal stenosis, lumbar region without neurogenic claudication [M48.061]  Other abnormalities of gait and mobility [R26.89]  Repeated falls [R29.6]       Treatment Diagnosis: LBP, decreased lumbar and B hip AROM, decreased B LE and core strength, impaired balance, impaired gait, and decreased activity tolerance    Plan of Care/Certification Expiration Date: 25   Progress Report Period from:  2025  to 2025    Visits to Date: 10 No Show: 2 Cancelled Appts: 0    OBJECTIVE:     Long Term Goals - Time Frame for Long Term Goals : 6 weeks  Goals Current/ Discharge status Status   Long Term Goal 1: The pt will be indep/compliant with HEP to self manage indep upon D/C LTG 1 Current Status:: 25 pt reports compliance w/ current   In progress   Long Term Goal 2: The pt will have a decrease in EVELYN score >/=10 points in order to increase functional activity tolerance LTG 2 Current Status:: 25 10/50   In progress   Long Term Goal 3: The pt will demo improved lumbar ext, B SB, and Rotation AROM >/=25%  in order to increase ease of ADL's LTG 3 Current Status:: 25 ext 75%, B % B rot 100%   Met   Long Term Goal 4: The pt will demo improved B hip IR/ER/Flex AROM >/=10* ea in order to improve lumbopelvic mechanics and decrease pain with ADL's LTG 4 Current Status:: 25 R IR 30 ER 35 L IR 35 ER 20; flex NT d/t clinical oversight; pt reports minimal to no pain   In progress   Long Term Goal 5: The pt will demo improved B LE strength >/= 4+ to 5/5 and core

## 2025-06-11 ENCOUNTER — HOSPITAL ENCOUNTER (OUTPATIENT)
Dept: PHYSICAL THERAPY | Age: 75
Setting detail: THERAPIES SERIES
Discharge: HOME | End: 2025-06-11
Payer: MEDICARE

## 2025-06-11 NOTE — PROGRESS NOTES
Therapy                            Cancellation/No-show Note      Date: 2025  Patient: Dorothy Mireles (75 y.o. female)  : 1950  MRN:  [id number]  Referring Physician: Evan Culp, APRN - CNP    Medical Diagnosis: Unspecified fracture of sacrum, subsequent encounter for fracture with routine healing [S32.10XD]  Spinal stenosis, lumbar region without neurogenic claudication [M48.061]  Other abnormalities of gait and mobility [R26.89]  Repeated falls [R29.6]      Visit Information:  Visits to Date 10   No Show/Cancelled Appts:       For today's appointment patient:  [x]  Cancelled  []  Rescheduled appointment  []  No-show   []  Called pt to remind of next appointment     Reason given by patient:  []  Patient ill  []  Conflicting appointment  []  No transportation    []  Conflict with work  []  No reason given  [x]  Other:  Cx per dept d/t no insurance authorization yet      [x] Pt has future appointments scheduled, no follow up needed  [] Pt requests to be on hold.    Reason:   If > 2 weeks please discuss with therapist.  [] Therapist to call pt for follow up  []  to call to re-schedule      Comments:       Signature: Electronically signed by Joanne Brunson PT on 25 at 10:40 AM EDT

## 2025-06-13 ENCOUNTER — HOSPITAL ENCOUNTER (OUTPATIENT)
Dept: PHYSICAL THERAPY | Age: 75
Setting detail: THERAPIES SERIES
Discharge: HOME | End: 2025-06-13
Payer: MEDICARE

## 2025-06-13 PROCEDURE — 97110 THERAPEUTIC EXERCISES: CPT

## 2025-06-13 NOTE — PROGRESS NOTES
[place] [address] [place] [address]  Phone: [phone]      Date: 2025  Patient: Dorothy Mireles  : 1950   Confirmed: Yes  MRN: [id number]  Referring Provider: Evan Culp APRN - CNP    Medical Diagnosis: Unspecified fracture of sacrum, subsequent encounter for fracture with routine healing [S32.10XD]  Spinal stenosis, lumbar region without neurogenic claudication [M48.061]  Other abnormalities of gait and mobility [R26.89]  Repeated falls [R29.6]       Treatment Diagnosis: LBP, decreased lumbar and B hip AROM, decreased B LE and core strength, impaired balance, impaired gait, and decreased activity tolerance    Visit Information:  Insurance: Payor: [facility] MEDICARE / Plan: [facility] MEDICARE COMPLETE / Product Type: *No Product type* /   PT Visit Information  Total # of Visits Approved: 12  Total # of Visits to Date: 11  Plan of Care/Certification Expiration Date: 25  No Show: 2  Progress Note Due Date: 25  Canceled Appointment: 0  Progress Note Counter:  (through 25)    Subjective Information:  Subjective: Pt reports doing a HIIT-style water class this AM and \"had a really good workout.\"  HEP Compliance:  [x] Good [] Fair [] Poor [] Reports not doing due to:    Pain Screening  Patient Currently in Pain: Denies    Treatment:  Exercises:  Exercises  Exercise 1: SF seat 18 arms 6 L5.5 x6 mins - VC's to push with heels to assist w/ glute activation and to keep feet on pedal  Exercise 2: apollo leg press feet high and wide for glute activation 20# x10; 30# x10  Exercise 4: STS w/o UE's focus on weight through midfoot x10 mat at chair height  Exercise 5: bridge w/ emphasis on weight through heel/mid foot x15  Exercise 12: cybex back ext 50# x15  Exercise 17: reviewed floor transfers with and without external support  Exercise 20: HEP: focus on more weight through heel/mid-foot     *Indicates exercise, modality, or manual techniques to be initiated when

## 2025-06-18 ENCOUNTER — HOSPITAL ENCOUNTER (OUTPATIENT)
Dept: PHYSICAL THERAPY | Age: 75
Setting detail: THERAPIES SERIES
Discharge: HOME | End: 2025-06-18
Payer: MEDICARE

## 2025-06-18 PROCEDURE — 97110 THERAPEUTIC EXERCISES: CPT

## 2025-06-18 NOTE — PROGRESS NOTES
[place] [address] [address] [place] [address]  Phone: [phone]      Date: 2025  Patient: Dorothy Mireles  : 1950   Confirmed: Yes  MRN: [id number]  Referring Provider: Evan Culp APRN - CNP    Medical Diagnosis: Unspecified fracture of sacrum, subsequent encounter for fracture with routine healing [S32.10XD]  Spinal stenosis, lumbar region without neurogenic claudication [M48.061]  Other abnormalities of gait and mobility [R26.89]  Repeated falls [R29.6]       Treatment Diagnosis: LBP, decreased lumbar and B hip AROM, decreased B LE and core strength, impaired balance, impaired gait, and decreased activity tolerance    Visit Information:  Insurance: Payor: [facility] MEDICARE / Plan: [facility] MEDICARE COMPLETE / Product Type: *No Product type* /   PT Visit Information  Total # of Visits Approved: 6 (25-25)  Total # of Visits to Date: 12  Plan of Care/Certification Expiration Date: 25  No Show: 2  Progress Note Due Date: 25  Canceled Appointment: 0  Progress Note Counter:  (through 25) ( on original POC)    Subjective Information:  Subjective: Pt reports focusing more on wt shifting to heels.  HEP Compliance:  [x] Good [] Fair [] Poor [] Reports not doing due to:    Pain Screening  Patient Currently in Pain: Denies    Treatment:  Exercises:  Exercises  Exercise 1: SF seat 18 arms 6 L5.5 x6 mins - VC's to push with heels to assist w/ glute activation and to keep feet on pedal  Exercise 2: apollo leg press feet high and wide for glute activation 30# 2x10  Exercise 4: STS w/o UE's focus on weight through midfoot x10 mat slightly above chair height; x10 at chair height  Exercise 12: cybex back ext 50# x15 60# x15  Exercise 15: hip hike 2'' step 2x10 B  Exercise 18: quadruped hip ext x10, fire hydrant x10  Exercise 20: HEP: cont current     *Indicates exercise, modality, or manual techniques to be initiated when appropriate    Objective Measures:

## 2025-06-20 ENCOUNTER — HOSPITAL ENCOUNTER (OUTPATIENT)
Dept: PHYSICAL THERAPY | Age: 75
Setting detail: THERAPIES SERIES
Discharge: HOME | End: 2025-06-20
Payer: MEDICARE

## 2025-06-20 PROCEDURE — 97110 THERAPEUTIC EXERCISES: CPT

## 2025-06-20 NOTE — PROGRESS NOTES
[place] [address] [facility] [place] [address]  Phone: [phone]      Date: 2025  Patient: Dorothy Mireles  : 1950   Confirmed: Yes  MRN: [id number]  Referring Provider: Evan Culp APRN - CNP    Medical Diagnosis: Unspecified fracture of sacrum, subsequent encounter for fracture with routine healing [S32.10XD]  Spinal stenosis, lumbar region without neurogenic claudication [M48.061]  Other abnormalities of gait and mobility [R26.89]  Repeated falls [R29.6]       Treatment Diagnosis: LBP, decreased lumbar and B hip AROM, decreased B LE and core strength, impaired balance, impaired gait, and decreased activity tolerance    Visit Information:  Insurance: Payor: [facility] MEDICARE / Plan: [facility] MEDICARE COMPLETE / Product Type: *No Product type* /   PT Visit Information  Total # of Visits Approved: 6 (25-25)  Total # of Visits to Date: 13  Plan of Care/Certification Expiration Date: 25  No Show: 2  Progress Note Due Date: 25  Canceled Appointment: 0  Progress Note Counter: 3/6 (through 25) ( on original POC)    Subjective Information:  Subjective: Pt reports \"I feel encouraged by PT and my progress so far.\"  HEP Compliance:  [x] Good [] Fair [] Poor [] Reports not doing due to:    Pain Screening  Patient Currently in Pain: Denies    Treatment:  Exercises:  Exercises  Exercise 12: cybex back ext 70# 2x15  Exercise 15: hip hike 2'' step 2x10 B - VC/TC's for technique  Exercise 16: objective measures  Exercise 20: HEP: cont current       *Indicates exercise, modality, or manual techniques to be initiated when appropriate    Objective Measures:     LTG 1 Current Status:: 25 pt reports compliance w/ current  LTG 2 Current Status:: 25 (+8 points)  LTG 3 Current Status:: 25 ext 85%, B % B rot 100%  LTG 4 Current Status:: 25 R IR 45 ER 35 flex 120 L IR 45 ER 30 flex 120; pt reports minimal to no pain  LTG 5 Current Status:: 25 LLE

## 2025-06-20 NOTE — PROGRESS NOTES
[place] [address] [facility] [place] [address]  Phone: [phone]    [] Certification  [] Recertification []  Plan of Care  [x] Progress Note [] Discharge      Referring Provider: Evan Culp APRN - CNP     From:  Joanne Brunson PT, DPT  Patient: Dorothy Mireles (75 y.o. female) : 1950 Date: 2025  Medical Diagnosis: Unspecified fracture of sacrum, subsequent encounter for fracture with routine healing [S32.10XD]  Spinal stenosis, lumbar region without neurogenic claudication [M48.061]  Other abnormalities of gait and mobility [R26.89]  Repeated falls [R29.6]       Treatment Diagnosis: LBP, decreased lumbar and B hip AROM, decreased B LE and core strength, impaired balance, impaired gait, and decreased activity tolerance    Plan of Care/Certification Expiration Date: 25   Progress Report Period from:  2025  to 2025    Visits to Date: 13 No Show: 2 Cancelled Appts: 0    OBJECTIVE:     Long Term Goals - Time Frame for Long Term Goals : 6 weeks  Goals Current/ Discharge status Status   Long Term Goal 1: The pt will be indep/compliant with HEP to self manage indep upon D/C LTG 1 Current Status:: 25 pt reports compliance w/ current   In progress   Long Term Goal 2: The pt will have a decrease in EVELYN score >/=10 points in order to increase functional activity tolerance LTG 2 Current Status:: 25 9/50 (+8 points)   In progress   Long Term Goal 3: The pt will demo improved lumbar ext, B SB, and Rotation AROM >/=25%  in order to increase ease of ADL's LTG 3 Current Status:: 25 ext 85%, B % B rot 100%   Met   Long Term Goal 4: The pt will demo improved B hip IR/ER/Flex AROM >/=10* ea in order to improve lumbopelvic mechanics and decrease pain with ADL's LTG 4 Current Status:: 25 R IR 45 ER 35 flex 120 L IR 45 ER 30 flex 120; pt reports minimal to no pain   In progress, Partially met   Long Term Goal 5: The pt will demo improved B LE strength >/= 4+ to

## 2025-06-24 NOTE — PROGRESS NOTES
Therapy                            Cancellation/No-show Note      Date: 2025  Patient: Dorothy Mireles (75 y.o. female)  : 1950  MRN:  [id number]  Referring Physician: Evan Culp, APRN - CNP    Medical Diagnosis: Unspecified fracture of sacrum, subsequent encounter for fracture with routine healing [S32.10XD]  Spinal stenosis, lumbar region without neurogenic claudication [M48.061]  Other abnormalities of gait and mobility [R26.89]  Repeated falls [R29.6]      Visit Information:  Visits to Date 13   No Show/Cancelled Appts:       For today's appointment patient:  [x]  Cancelled  []  Rescheduled appointment  []  No-show   []  Called pt to remind of next appointment     Reason given by patient:  []  Patient ill  []  Conflicting appointment  []  No transportation    []  Conflict with work  []  No reason given  [x]  Other:  Pt out of town    [x] Pt has future appointments scheduled, no follow up needed  [] Pt requests to be on hold.    Reason:   If > 2 weeks please discuss with therapist.  [] Therapist to call pt for follow up  [] [place] to call to re-schedule      Comments:       Signature: Electronically signed by Joanne Brunson PT on 25 at 1:36 PM EDT

## 2025-06-25 ENCOUNTER — HOSPITAL ENCOUNTER (OUTPATIENT)
Dept: PHYSICAL THERAPY | Age: 75
Setting detail: THERAPIES SERIES
Discharge: HOME | End: 2025-06-25
Payer: MEDICARE

## 2025-06-27 ENCOUNTER — HOSPITAL ENCOUNTER (OUTPATIENT)
Dept: PHYSICAL THERAPY | Age: 75
Setting detail: THERAPIES SERIES
Discharge: HOME | End: 2025-06-27
Payer: MEDICARE

## 2025-06-27 PROCEDURE — 97110 THERAPEUTIC EXERCISES: CPT

## 2025-06-27 NOTE — PROGRESS NOTES
met   LTG 6 The pt will demo 5x sit to stand and TUG </=13 secondsand B SLS >/=5\" in order to increase safety with functional mobility In progress, Partially met   LTG 7 The pt will demo improved DGI >/=18 to improve dynamic and functional balance and decrease risk for falls Met          Plan:  Frequency/Duration:  Plan  Plan Frequency: 2  Plan weeks: 1-2 additional weeks  Current Treatment Recommendations: Strengthening, ROM, Balance training, Functional mobility training, Gait training, Stair training, Transfer training, Neuromuscular re-education, Manual, Pain management, Home exercise program, Safety education & training, Patient/Caregiver education & training, Equipment evaluation, education, & procurement, Modalities, Therapeutic activities, Group Therapy  Modalities: Heat/Cold, E-stim - unattended  Pt to continue current HEP.  See objective section for any therapeutic exercise changes, additions or modifications this date.    Therapy Time:      PT Individual Minutes  Time In: 1420  Time Out: 1502  Minutes: 42  Timed Code Treatment Minutes: 42 Minutes  Procedure Minutes: 0    Timed Activity Minutes Units   Ther Ex 42 3     Electronically signed by Su Gomez PTA on 6/27/25 at 2:25 PM EDT

## 2025-07-02 ENCOUNTER — HOSPITAL ENCOUNTER (OUTPATIENT)
Dept: PHYSICAL THERAPY | Age: 75
Setting detail: THERAPIES SERIES
Discharge: HOME | End: 2025-07-02
Payer: MEDICARE

## 2025-07-02 NOTE — PROGRESS NOTES
Therapy                            Cancellation/No-show Note      Date: 2025  Patient: Dorothy Mireles (75 y.o. female)  : 1950  MRN:  [id number]  Referring Physician: Evan Culp, APRN - CNP    Medical Diagnosis: Unspecified fracture of sacrum, subsequent encounter for fracture with routine healing [S32.10XD]  Spinal stenosis, lumbar region without neurogenic claudication [M48.061]  Other abnormalities of gait and mobility [R26.89]  Repeated falls [R29.6]      Visit Information:  Visits to Date 14   No Show/Cancelled Appts:       For today's appointment patient:  [x]  Cancelled  []  Rescheduled appointment  []  No-show   []  Called pt to remind of next appointment     Reason given by patient:  []  Patient ill  []  Conflicting appointment  []  No transportation    []  Conflict with work  []  No reason given  [x]  Other:  family emergency    [x] Pt has future appointments scheduled, no follow up needed  [] Pt requests to be on hold.    Reason:   If > 2 weeks please discuss with therapist.  [] Therapist to call pt for follow up  [] [place] to call to re-schedule      Comments:       Signature: Electronically signed by Joanne Brunson PT on 25 at 1:39 PM EDT

## 2025-07-03 ENCOUNTER — HOSPITAL ENCOUNTER (OUTPATIENT)
Dept: PHYSICAL THERAPY | Age: 75
Setting detail: THERAPIES SERIES
Discharge: HOME | End: 2025-07-03
Payer: MEDICARE

## 2025-07-03 PROCEDURE — 97110 THERAPEUTIC EXERCISES: CPT

## 2025-07-03 PROCEDURE — 97112 NEUROMUSCULAR REEDUCATION: CPT

## 2025-07-03 NOTE — PROGRESS NOTES
[place] [address] [address] [facility] [place] [address]  Phone: [phone]      Date: 7/3/2025  Patient: Dorothy Mireles  : 1950   Confirmed: Yes  MRN: [id number]  Referring Provider: Evan Culp APRN - CNP    Medical Diagnosis: Unspecified fracture of sacrum, subsequent encounter for fracture with routine healing [S32.10XD]  Spinal stenosis, lumbar region without neurogenic claudication [M48.061]  Other abnormalities of gait and mobility [R26.89]  Repeated falls [R29.6]       Treatment Diagnosis: LBP, decreased lumbar and B hip AROM, decreased B LE and core strength, impaired balance, impaired gait, and decreased activity tolerance    Visit Information:  Insurance: Payor: [facility] MEDICARE / Plan: [facility] MEDICARE COMPLETE / Product Type: *No Product type* /   PT Visit Information  Total # of Visits Approved: 6 (25-25)  Total # of Visits to Date: 15  Plan of Care/Certification Expiration Date: 25  No Show: 2  Progress Note Due Date: 25  Canceled Appointment: 2  Progress Note Counter:  (through 25)    Subjective Information:  Subjective: Pt reprots she conts to use 3 [employer] if she is going to a store though uses it because she is able to stand more upright and is able to walk faster with it.  HEP Compliance:  [x] Good [] Fair [] Poor [] Reports not doing due to:    Pain Screening  Patient Currently in Pain: Denies    Treatment:  Exercises:  Exercises  Exercise 1: SF UE/LE L6.0 x5 mins VC's for TA activation seat at 10  Exercise 2: apollo leg press feet high and wide for glute activation 40# 1x14 30# 2x15  Exercise 12: cybex back ext 75# 2x15  Exercise 15: vector step taps fwd/lat/retro x10 B focus on SLS  Exercise 16: dynamic balance training: hurdles 6\" (6) reciprocal x2, lateral x2 (SBA)  Exercise 17: large rockerboard 3 way x15 no UE's  Exercise 18: 6\" robyn tap wihtout UE's x10 B alternating (SBA for safety)  Exercise 20: HEP: vectors, step taps, hip hikes    *Indicates

## 2025-07-09 ENCOUNTER — HOSPITAL ENCOUNTER (OUTPATIENT)
Dept: PHYSICAL THERAPY | Age: 75
Setting detail: THERAPIES SERIES
Discharge: HOME | End: 2025-07-09
Payer: MEDICARE

## 2025-07-09 NOTE — PROGRESS NOTES
[place] [address] [facility] [place] [address]  Phone: [phone]    [] Certification  [] Recertification []  Plan of Care  [x] Progress Note [] Discharge      Referring Provider: Evan Culp APRN - CNP     From:  Joanne Brunson, PT, DPT  Patient: Dorothy Mireles (75 y.o. female) : 1950 Date: 2025  Medical Diagnosis: Unspecified fracture of sacrum, subsequent encounter for fracture with routine healing [S32.10XD]  Spinal stenosis, lumbar region without neurogenic claudication [M48.061]  Other abnormalities of gait and mobility [R26.89]  Repeated falls [R29.6]       Treatment Diagnosis: LBP, decreased lumbar and B hip AROM, decreased B LE and core strength, impaired balance, impaired gait, and decreased activity tolerance     Plan of Care/Certification Expiration Date: 25   Progress Report Period from:  25  to 7/3/25    Visits to Date: 15 No Show: 3 Cancelled Appts: 2    OBJECTIVE:   Long Term Goals - Time Frame for Long Term Goals : 6 weeks  Goals Current/ Discharge status Status   Long Term Goal 1: The pt will be indep/compliant with HEP to self manage indep upon D/C LTG 1 Current Status:: 7/3/25- Good compliance with HEP   In progress, Partially met   Long Term Goal 2: The pt will have a decrease in EVELYN score >/=10 points in order to increase functional activity tolerance LTG 2 Current Status:: 25 9/50 (+8 points)   In progress   Long Term Goal 3: The pt will demo improved lumbar ext, B SB, and Rotation AROM >/=25%  in order to increase ease of ADL's LTG 3 Current Status:: 25 ext 85%, B % B rot 100%; 25 per phone discussion pt with increasing ease of ADL's   Met   Long Term Goal 4: The pt will demo improved B hip IR/ER/Flex AROM >/=10* ea in order to improve lumbopelvic mechanics and decrease pain with ADL's LTG 4 Current Status:: 25 R IR 45 ER 35 flex 120 L IR 45 ER 30 flex 120; pt reports minimal to no pain; 25: per phone discussion

## 2025-07-09 NOTE — PROGRESS NOTES
Therapy                            Cancellation/No-show Note      Date: 2025  Patient: Dorothy Mireles (75 y.o. female)  : 1950  MRN:  [id number]  Referring Physician: Evan Culp, APRN - CNP    Medical Diagnosis: Unspecified fracture of sacrum, subsequent encounter for fracture with routine healing [S32.10XD]  Spinal stenosis, lumbar region without neurogenic claudication [M48.061]  Other abnormalities of gait and mobility [R26.89]  Repeated falls [R29.6]      Visit Information:  Visits to Date 15   No Show/Cancelled Appts: 3 / 2      For today's appointment patient:  []  Cancelled  []  Rescheduled appointment  [x]  No-show   []  Called pt to remind of next appointment     Reason given by patient:  []  Patient ill  []  Conflicting appointment  []  No transportation    []  Conflict with work  []  No reason given  []  Other:      [] Pt has future appointments scheduled, no follow up needed  [] Pt requests to be on hold.    Reason:   If > 2 weeks please discuss with therapist.  [] Therapist to call pt for follow up  [] [place] to call to re-schedule      Comments: Called and left VM of no further scheduled appts and date range for insurance authorization is up this date. Asked pt to call back to discuss future plan of care.       Signature: Electronically signed by Joanne Brunson PT on 25 at 2:40 PM EDT

## 2025-07-25 ENCOUNTER — HOSPITAL ENCOUNTER (OUTPATIENT)
Dept: PHYSICAL THERAPY | Age: 75
Setting detail: THERAPIES SERIES
Discharge: HOME | End: 2025-07-25
Payer: MEDICARE

## 2025-07-25 PROCEDURE — 97112 NEUROMUSCULAR REEDUCATION: CPT

## 2025-07-25 PROCEDURE — 97110 THERAPEUTIC EXERCISES: CPT

## 2025-07-25 NOTE — PROGRESS NOTES
[place] [address] [facility] [place] [address]  Phone: [phone]      Date: 2025  Patient: Dorothy Mireles  : 1950   Confirmed: Yes  MRN: [id number]  Referring Provider: Evan Culp APRN - CNP    Medical Diagnosis: Unspecified fracture of sacrum, subsequent encounter for fracture with routine healing [S32.10XD]  Spinal stenosis, lumbar region without neurogenic claudication [M48.061]  Other abnormalities of gait and mobility [R26.89]  Repeated falls [R29.6]       Treatment Diagnosis: LBP, decreased lumbar and B hip AROM, decreased B LE and core strength, impaired balance, impaired gait, and decreased activity tolerance    Visit Information:  Insurance: Payor: [facility] MEDICARE / Plan: [facility] MEDICARE COMPLETE / Product Type: *No Product type* /   PT Visit Information  Total # of Visits Approved: 6 (25-25)  Total # of Visits to Date: 16  Plan of Care/Certification Expiration Date: 10/09/25  No Show: 3  Progress Note Due Date: 25  Canceled Appointment: 2  Progress Note Counter: /3 by 25    Subjective Information:  Subjective: Pt reports she babysat 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 Pt reports she does feel doing the HIIT water class has helped and her strength is improving a lot.  HEP Compliance:  [] Good [x] Fair [] Poor [] Reports not doing due to:    Pain Screening  Patient Currently in Pain: Denies    Treatment:  Exercises:  Exercises  Exercise 1: SF UE/LE L6.0 x5 mins VC's for TA activation seat at 18  Exercise 12: cybex back ext 80# 2x15  Exercise 13: step up to march on airex foam F/L x10 SBA  Exercise 16: dynamic balance training: hurdles 6\" (6) reciprocal x1, non-reciprocal x3 lateral x3 (SBA)  Exercise 17: large rockerboard 3 way x15 int UE support needed, hossein w/ RLE back LLE fwd d/t fatigue at end of session  Exercise 20: HEP cont current, increase

## 2025-07-28 ENCOUNTER — HOSPITAL ENCOUNTER (OUTPATIENT)
Dept: PHYSICAL THERAPY | Age: 75
Setting detail: THERAPIES SERIES
Discharge: HOME | End: 2025-07-28
Payer: MEDICARE

## 2025-07-28 PROCEDURE — 97112 NEUROMUSCULAR REEDUCATION: CPT

## 2025-07-28 PROCEDURE — 97110 THERAPEUTIC EXERCISES: CPT

## 2025-07-28 NOTE — PROGRESS NOTES
[address] [place] [address]  Phone: [phone]      Date: 2025  Patient: Dorothy Mireles  : 1950   Confirmed: Yes  MRN: [id number]  Referring Provider: Evan Culp APRN - CNP    Medical Diagnosis: Unspecified fracture of sacrum, subsequent encounter for fracture with routine healing [S32.10XD]  Spinal stenosis, lumbar region without neurogenic claudication [M48.061]  Other abnormalities of gait and mobility [R26.89]  Repeated falls [R29.6]       Treatment Diagnosis: LBP, decreased lumbar and B hip AROM, decreased B LE and core strength, impaired balance, impaired gait, and decreased activity tolerance    Visit Information:  Insurance: Payor: [facility] MEDICARE / Plan: [facility] MEDICARE COMPLETE / Product Type: *No Product type* /   PT Visit Information  Total # of Visits Approved: 3 (by 25)  Total # of Visits to Date: 17  Plan of Care/Certification Expiration Date: 25  No Show: 3  Progress Note Due Date: 25  Canceled Appointment: 2  Progress Note Counter: 2/3 by 25    Subjective Information:  Subjective: Pt reports fatigue following last tx session. Pt reports she's noticed when she's walking she veers to the left a lot.  HEP Compliance:  [x] Good [] Fair [] Poor [] Reports not doing due to:    Pain Screening  Patient Currently in Pain: Denies    Treatment:  Exercises:  Exercises  Exercise 1: SF UE/LE L6.3 x5 mins VC's for TA activation seat at 18  Exercise 12: cybex back ext 80# x10 70# x15  Exercise 13: step up to march on airex foam F/L x10 SBA-Gerson  Exercise 14: gait drills: tandem, march w/ opp UE raise x2 laps ea w/ close SBA  Exercise 17: large rockerboard 3 way x15 int UE support needed  Exercise 18: objective measures  Exercise 20: HEP cont current      *Indicates exercise, modality, or manual techniques to be initiated when appropriate    Objective Measures:     LTG 6 Current Status:: 25 5x STS w/ UE's 13.69'' w/o UE's 19.65''; TUG

## 2025-07-30 ENCOUNTER — HOSPITAL ENCOUNTER (OUTPATIENT)
Dept: PHYSICAL THERAPY | Age: 75
Setting detail: THERAPIES SERIES
Discharge: HOME | End: 2025-07-30
Payer: MEDICARE

## 2025-07-30 PROCEDURE — 97110 THERAPEUTIC EXERCISES: CPT

## 2025-07-30 NOTE — PROGRESS NOTES
[address] [place] [address]  Phone: [phone]      Date: 2025  Patient: Dorothy Mireles  : 1950   Confirmed: Yes  MRN: [id number]  Referring Provider: Evan Culp APRN - CNP    Medical Diagnosis: Unspecified fracture of sacrum, subsequent encounter for fracture with routine healing [S32.10XD]  Spinal stenosis, lumbar region without neurogenic claudication [M48.061]  Other abnormalities of gait and mobility [R26.89]  Repeated falls [R29.6]       Treatment Diagnosis: LBP, decreased lumbar and B hip AROM, decreased B LE and core strength, impaired balance, impaired gait, and decreased activity tolerance    Visit Information:  Insurance: Payor: [facility] MEDICARE / Plan: [facility] MEDICARE COMPLETE / Product Type: *No Product type* /   PT Visit Information  Total # of Visits Approved: 3 (by 25)  Total # of Visits to Date: 18  Plan of Care/Certification Expiration Date: 10/09/25  No Show: 3  Progress Note Due Date: 25  Canceled Appointment: 2  Progress Note Counter: 3/3 by 25    Subjective Information:  Subjective: Pt reports \"my gait is off today. I think I overdid it this morning I swam a mile, did an aquatic class, and messed around in the pool for awhile.\"  HEP Compliance:  [x] Good [] Fair [] Poor [] Reports not doing due to:    Pain Screening  Patient Currently in Pain: Denies    Treatment:  Objective measures, reviewed goals/progress, discussed HEP and workout classes    *Indicates exercise, modality, or manual techniques to be initiated when appropriate    Objective Measures:     LTG 1 Current Status:: 25 good compliance w/ HEP  LTG 2 Current Status:: 25 7/50 +10 pts  LTG 3 Current Status:: 25 ext 50%, B % B rot 100%; Pt reports no issues w/ ADL's w/ the exception of balance  LTG 4 Current Status:: 25 R IR 45 ER 35 flex 120 L IR 45 ER 30 flex 120; pt reports minimal to no pain  LTG 5 Current Status:: 25 LLE DF/PF 4+/5 knee

## 2025-07-30 NOTE — PROGRESS NOTES
[address] [facility] [place] [address]  Phone: [phone]    [] Certification  [] Recertification []  Plan of Care  [] Progress Note [x] Discharge      Referring Provider: Evan Culp APRN - CNP     From:  Joanne Brunson PT, DPT  Patient: Dorothy Mireles (75 y.o. female) : 1950 Date: 2025  Medical Diagnosis: Unspecified fracture of sacrum, subsequent encounter for fracture with routine healing [S32.10XD]  Spinal stenosis, lumbar region without neurogenic claudication [M48.061]  Other abnormalities of gait and mobility [R26.89]  Repeated falls [R29.6]       Treatment Diagnosis: LBP, decreased lumbar and B hip AROM, decreased B LE and core strength, impaired balance, impaired gait, and decreased activity tolerance    Plan of Care/Certification Expiration Date: 10/09/25   Progress Report Period from:  2025  to 2025    Visits to Date: 18 No Show: 3 Cancelled Appts: 2    OBJECTIVE:     Long Term Goals - Time Frame for Long Term Goals : 6 weeks  Goals Current/ Discharge status Status   Long Term Goal 1: The pt will be indep/compliant with HEP to self manage indep upon D/C LTG 1 Current Status:: 25 good compliance w/ HEP   Partially met   Long Term Goal 2: The pt will have a decrease in EVELYN score >/=10 points in order to increase functional activity tolerance LTG 2 Current Status:: 25 7/50 +10 pts   Met   Long Term Goal 3: The pt will demo improved lumbar ext, B SB, and Rotation AROM >/=25%  in order to increase ease of ADL's LTG 3 Current Status:: 25 ext 50%, B % B rot 100%; Pt reports no issues w/ ADL's w/ the exception of balance   Met   Long Term Goal 4: The pt will demo improved B hip IR/ER/Flex AROM >/=10* ea in order to improve lumbopelvic mechanics and decrease pain with ADL's LTG 4 Current Status:: 25 R IR 45 ER 35 flex 120 L IR 45 ER 30 flex 120; pt reports minimal to no pain   Partially met   Long Term Goal 5: The pt will demo